# Patient Record
Sex: FEMALE | Race: WHITE | NOT HISPANIC OR LATINO | Employment: FULL TIME | URBAN - METROPOLITAN AREA
[De-identification: names, ages, dates, MRNs, and addresses within clinical notes are randomized per-mention and may not be internally consistent; named-entity substitution may affect disease eponyms.]

---

## 2020-04-28 ENCOUNTER — APPOINTMENT (OUTPATIENT)
Dept: WOMENS IMAGING | Facility: HOSPITAL | Age: 40
End: 2020-04-28

## 2020-04-28 PROCEDURE — 76641 ULTRASOUND BREAST COMPLETE: CPT | Performed by: RADIOLOGY

## 2020-04-28 PROCEDURE — G0279 TOMOSYNTHESIS, MAMMO: HCPCS | Performed by: RADIOLOGY

## 2020-04-28 PROCEDURE — 77066 DX MAMMO INCL CAD BI: CPT | Performed by: RADIOLOGY

## 2020-04-28 PROCEDURE — 77062 BREAST TOMOSYNTHESIS BI: CPT | Performed by: RADIOLOGY

## 2020-05-12 ENCOUNTER — APPOINTMENT (OUTPATIENT)
Dept: WOMENS IMAGING | Facility: HOSPITAL | Age: 40
End: 2020-05-12

## 2020-05-12 PROCEDURE — 76641 ULTRASOUND BREAST COMPLETE: CPT | Performed by: RADIOLOGY

## 2020-05-12 PROCEDURE — 19083 BX BREAST 1ST LESION US IMAG: CPT | Performed by: RADIOLOGY

## 2020-05-12 PROCEDURE — 19000 PUNCTURE ASPIR CYST BREAST: CPT | Performed by: RADIOLOGY

## 2020-05-12 PROCEDURE — 19001 PUNCTURE ASPIR CYST BRST EA: CPT | Performed by: RADIOLOGY

## 2021-05-05 ENCOUNTER — APPOINTMENT (OUTPATIENT)
Dept: WOMENS IMAGING | Facility: HOSPITAL | Age: 41
End: 2021-05-05

## 2021-05-05 PROCEDURE — 77067 SCR MAMMO BI INCL CAD: CPT | Performed by: RADIOLOGY

## 2021-05-05 PROCEDURE — 77063 BREAST TOMOSYNTHESIS BI: CPT | Performed by: RADIOLOGY

## 2022-05-06 ENCOUNTER — APPOINTMENT (OUTPATIENT)
Dept: WOMENS IMAGING | Facility: HOSPITAL | Age: 42
End: 2022-05-06

## 2022-05-06 PROCEDURE — 77063 BREAST TOMOSYNTHESIS BI: CPT | Performed by: RADIOLOGY

## 2022-05-06 PROCEDURE — 77067 SCR MAMMO BI INCL CAD: CPT | Performed by: RADIOLOGY

## 2023-03-31 ENCOUNTER — HOSPITAL ENCOUNTER (INPATIENT)
Facility: HOSPITAL | Age: 43
LOS: 6 days | Discharge: HOME | DRG: 338 | End: 2023-04-07
Attending: EMERGENCY MEDICINE | Admitting: SURGERY
Payer: COMMERCIAL

## 2023-03-31 ENCOUNTER — ANESTHESIA EVENT (OUTPATIENT)
Dept: PERIOP | Facility: HOSPITAL | Age: 43
DRG: 338 | End: 2023-03-31
Payer: COMMERCIAL

## 2023-03-31 ENCOUNTER — APPOINTMENT (OUTPATIENT)
Dept: CT IMAGING | Facility: HOSPITAL | Age: 43
DRG: 338 | End: 2023-03-31
Payer: COMMERCIAL

## 2023-03-31 ENCOUNTER — ANESTHESIA (OUTPATIENT)
Dept: PERIOP | Facility: HOSPITAL | Age: 43
DRG: 338 | End: 2023-03-31
Payer: COMMERCIAL

## 2023-03-31 DIAGNOSIS — T81.43XA POSTOPERATIVE INTRA-ABDOMINAL ABSCESS: ICD-10-CM

## 2023-03-31 DIAGNOSIS — K35.30 ACUTE APPENDICITIS WITH LOCALIZED PERITONITIS WITHOUT GANGRENE, UNSPECIFIED WHETHER ABSCESS PRESENT, UNSPECIFIED WHETHER PERFORATION PRESENT: Primary | ICD-10-CM

## 2023-03-31 DIAGNOSIS — K80.20 CALCULUS OF GALLBLADDER WITHOUT CHOLECYSTITIS WITHOUT OBSTRUCTION: ICD-10-CM

## 2023-03-31 PROBLEM — K35.32 ACUTE APPENDICITIS WITH PERFORATION, LOCALIZED PERITONITIS, AND GANGRENE, WITHOUT ABSCESS: Status: ACTIVE | Noted: 2023-03-31

## 2023-03-31 LAB
ALBUMIN SERPL-MCNC: 4.2 G/DL (ref 3.5–5.2)
ALBUMIN/GLOB SERPL: 1.4 G/DL
ALP SERPL-CCNC: 71 U/L (ref 39–117)
ALT SERPL W P-5'-P-CCNC: 18 U/L (ref 1–33)
ANION GAP SERPL CALCULATED.3IONS-SCNC: 10.7 MMOL/L (ref 5–15)
AST SERPL-CCNC: 13 U/L (ref 1–32)
B-HCG UR QL: NEGATIVE
BACTERIA UR QL AUTO: ABNORMAL /HPF
BASOPHILS # BLD AUTO: 0.06 10*3/MM3 (ref 0–0.2)
BASOPHILS NFR BLD AUTO: 0.3 % (ref 0–1.5)
BILIRUB SERPL-MCNC: 1.4 MG/DL (ref 0–1.2)
BILIRUB UR QL STRIP: NEGATIVE
BUN SERPL-MCNC: 9 MG/DL (ref 6–20)
BUN/CREAT SERPL: 9.4 (ref 7–25)
CALCIUM SPEC-SCNC: 9.2 MG/DL (ref 8.6–10.5)
CHLORIDE SERPL-SCNC: 101 MMOL/L (ref 98–107)
CLARITY UR: CLEAR
CO2 SERPL-SCNC: 25.3 MMOL/L (ref 22–29)
COLOR UR: ABNORMAL
CREAT SERPL-MCNC: 0.96 MG/DL (ref 0.57–1)
D-LACTATE SERPL-SCNC: 1.4 MMOL/L (ref 0.5–2)
DEPRECATED RDW RBC AUTO: 40.8 FL (ref 37–54)
EGFRCR SERPLBLD CKD-EPI 2021: 75.4 ML/MIN/1.73
EOSINOPHIL # BLD AUTO: 0 10*3/MM3 (ref 0–0.4)
EOSINOPHIL NFR BLD AUTO: 0 % (ref 0.3–6.2)
ERYTHROCYTE [DISTWIDTH] IN BLOOD BY AUTOMATED COUNT: 12.4 % (ref 12.3–15.4)
GLOBULIN UR ELPH-MCNC: 3 GM/DL
GLUCOSE SERPL-MCNC: 149 MG/DL (ref 65–99)
GLUCOSE UR STRIP-MCNC: NEGATIVE MG/DL
HCT VFR BLD AUTO: 42.2 % (ref 34–46.6)
HGB BLD-MCNC: 14.1 G/DL (ref 12–15.9)
HGB UR QL STRIP.AUTO: ABNORMAL
HOLD SPECIMEN: NORMAL
HYALINE CASTS UR QL AUTO: ABNORMAL /LPF
IMM GRANULOCYTES # BLD AUTO: 0.23 10*3/MM3 (ref 0–0.05)
IMM GRANULOCYTES NFR BLD AUTO: 1 % (ref 0–0.5)
KETONES UR QL STRIP: ABNORMAL
LEUKOCYTE ESTERASE UR QL STRIP.AUTO: ABNORMAL
LIPASE SERPL-CCNC: 15 U/L (ref 13–60)
LYMPHOCYTES # BLD AUTO: 0.87 10*3/MM3 (ref 0.7–3.1)
LYMPHOCYTES NFR BLD AUTO: 4 % (ref 19.6–45.3)
MCH RBC QN AUTO: 29.6 PG (ref 26.6–33)
MCHC RBC AUTO-ENTMCNC: 33.4 G/DL (ref 31.5–35.7)
MCV RBC AUTO: 88.7 FL (ref 79–97)
MONOCYTES # BLD AUTO: 1.26 10*3/MM3 (ref 0.1–0.9)
MONOCYTES NFR BLD AUTO: 5.7 % (ref 5–12)
NEUTROPHILS NFR BLD AUTO: 19.58 10*3/MM3 (ref 1.7–7)
NEUTROPHILS NFR BLD AUTO: 89 % (ref 42.7–76)
NITRITE UR QL STRIP: NEGATIVE
NRBC BLD AUTO-RTO: 0 /100 WBC (ref 0–0.2)
PH UR STRIP.AUTO: 5.5 [PH] (ref 5–8)
PLATELET # BLD AUTO: 337 10*3/MM3 (ref 140–450)
PMV BLD AUTO: 9.5 FL (ref 6–12)
POTASSIUM SERPL-SCNC: 3.5 MMOL/L (ref 3.5–5.2)
PROT SERPL-MCNC: 7.2 G/DL (ref 6–8.5)
PROT UR QL STRIP: ABNORMAL
RBC # BLD AUTO: 4.76 10*6/MM3 (ref 3.77–5.28)
RBC # UR STRIP: ABNORMAL /HPF
REF LAB TEST METHOD: ABNORMAL
SODIUM SERPL-SCNC: 137 MMOL/L (ref 136–145)
SP GR UR STRIP: 1.02 (ref 1–1.03)
SQUAMOUS #/AREA URNS HPF: ABNORMAL /HPF
UROBILINOGEN UR QL STRIP: ABNORMAL
WBC # UR STRIP: ABNORMAL /HPF
WBC NRBC COR # BLD: 22 10*3/MM3 (ref 3.4–10.8)
WHOLE BLOOD HOLD COAG: NORMAL

## 2023-03-31 PROCEDURE — G0378 HOSPITAL OBSERVATION PER HR: HCPCS

## 2023-03-31 PROCEDURE — 88304 TISSUE EXAM BY PATHOLOGIST: CPT | Performed by: SURGERY

## 2023-03-31 PROCEDURE — 25010000002 PIPERACILLIN SOD-TAZOBACTAM PER 1 G: Performed by: SURGERY

## 2023-03-31 PROCEDURE — 83605 ASSAY OF LACTIC ACID: CPT | Performed by: EMERGENCY MEDICINE

## 2023-03-31 PROCEDURE — 80053 COMPREHEN METABOLIC PANEL: CPT | Performed by: EMERGENCY MEDICINE

## 2023-03-31 PROCEDURE — 99222 1ST HOSP IP/OBS MODERATE 55: CPT | Performed by: SURGERY

## 2023-03-31 PROCEDURE — 83690 ASSAY OF LIPASE: CPT | Performed by: EMERGENCY MEDICINE

## 2023-03-31 PROCEDURE — 99285 EMERGENCY DEPT VISIT HI MDM: CPT

## 2023-03-31 PROCEDURE — 25510000001 IOPAMIDOL 61 % SOLUTION: Performed by: EMERGENCY MEDICINE

## 2023-03-31 PROCEDURE — 81025 URINE PREGNANCY TEST: CPT | Performed by: EMERGENCY MEDICINE

## 2023-03-31 PROCEDURE — 25010000002 ONDANSETRON PER 1 MG: Performed by: EMERGENCY MEDICINE

## 2023-03-31 PROCEDURE — 25010000002 DEXAMETHASONE SODIUM PHOSPHATE 20 MG/5ML SOLUTION

## 2023-03-31 PROCEDURE — 25010000002 PIPERACILLIN SOD-TAZOBACTAM PER 1 G: Performed by: EMERGENCY MEDICINE

## 2023-03-31 PROCEDURE — 25010000002 FENTANYL CITRATE (PF) 50 MCG/ML SOLUTION

## 2023-03-31 PROCEDURE — 25010000002 CEFAZOLIN IN DEXTROSE 2-4 GM/100ML-% SOLUTION: Performed by: SURGERY

## 2023-03-31 PROCEDURE — 25010000002 MIDAZOLAM PER 1 MG: Performed by: ANESTHESIOLOGY

## 2023-03-31 PROCEDURE — 25010000002 HYDROMORPHONE PER 4 MG

## 2023-03-31 PROCEDURE — 0DTJ4ZZ RESECTION OF APPENDIX, PERCUTANEOUS ENDOSCOPIC APPROACH: ICD-10-PCS | Performed by: SURGERY

## 2023-03-31 PROCEDURE — 74177 CT ABD & PELVIS W/CONTRAST: CPT

## 2023-03-31 PROCEDURE — 81001 URINALYSIS AUTO W/SCOPE: CPT | Performed by: EMERGENCY MEDICINE

## 2023-03-31 PROCEDURE — 87040 BLOOD CULTURE FOR BACTERIA: CPT | Performed by: EMERGENCY MEDICINE

## 2023-03-31 PROCEDURE — 25010000002 PROPOFOL 10 MG/ML EMULSION

## 2023-03-31 PROCEDURE — 44970 LAPAROSCOPY APPENDECTOMY: CPT | Performed by: SURGERY

## 2023-03-31 PROCEDURE — 25010000002 ONDANSETRON PER 1 MG

## 2023-03-31 PROCEDURE — 85025 COMPLETE CBC W/AUTO DIFF WBC: CPT | Performed by: EMERGENCY MEDICINE

## 2023-03-31 PROCEDURE — 25010000002 FENTANYL CITRATE (PF) 50 MCG/ML SOLUTION: Performed by: ANESTHESIOLOGY

## 2023-03-31 DEVICE — THE ECHELON, ECHELON ENDOPATH™ AND ECHELON FLEX™ FAMILIES OF ENDOSCOPIC LINEAR CUTTERS AND RELOADS ARE STERILE, SINGLE PATIENT USE INSTRUMENTS THAT SIMULTANEOUSLY CUT AND STAPLE TISSUE. THERE ARE SIX STAGGERED ROWS OF STAPLES, THREE ON EITHER SIDE OF THE CUT LINE. THE 45 MM INSTRUMENTS HAVE A STAPLE LINE THATIS APPROXIMATELY 45 MM LONG AND A CUT LINE THAT IS APPROXIMATELY 42 MM LONG. THE SHAFT CAN ROTATE FREELY IN BOTH DIRECTIONS AND AN ARTICULATION MECHANISM ON ARTICULATING INSTRUMENTS ENABLES BENDING THE DISTAL PORTIONOF THE SHAFT TO FACILITATE LATERAL ACCESS OF THE OPERATIVE SITE.THE INSTRUMENTS ARE SHIPPED WITHOUT A RELOAD AND MUST BE LOADED PRIOR TO USE. A STAPLE RETAINING CAP ON THE RELOAD PROTECTS THE STAPLE LEG POINTS DURING SHIPPING AND TRANSPORTATION. THE INSTRUMENTS’ LOCK-OUT FEATURE IS DESIGNED TO PREVENT A USED RELOAD FROM BEING REFIRED.
Type: IMPLANTABLE DEVICE | Site: ABDOMEN | Status: FUNCTIONAL
Brand: ECHELON ENDOPATH

## 2023-03-31 RX ORDER — FENTANYL CITRATE 50 UG/ML
50 INJECTION, SOLUTION INTRAMUSCULAR; INTRAVENOUS
Status: DISCONTINUED | OUTPATIENT
Start: 2023-03-31 | End: 2023-03-31 | Stop reason: HOSPADM

## 2023-03-31 RX ORDER — ACETAMINOPHEN 500 MG
1000 TABLET ORAL ONCE
Status: COMPLETED | OUTPATIENT
Start: 2023-03-31 | End: 2023-03-31

## 2023-03-31 RX ORDER — LABETALOL HYDROCHLORIDE 5 MG/ML
5 INJECTION, SOLUTION INTRAVENOUS
Status: DISCONTINUED | OUTPATIENT
Start: 2023-03-31 | End: 2023-03-31 | Stop reason: HOSPADM

## 2023-03-31 RX ORDER — HYDROCODONE BITARTRATE AND ACETAMINOPHEN 7.5; 325 MG/1; MG/1
1 TABLET ORAL ONCE AS NEEDED
Status: DISCONTINUED | OUTPATIENT
Start: 2023-03-31 | End: 2023-03-31 | Stop reason: HOSPADM

## 2023-03-31 RX ORDER — HYDROMORPHONE HYDROCHLORIDE 1 MG/ML
0.5 INJECTION, SOLUTION INTRAMUSCULAR; INTRAVENOUS; SUBCUTANEOUS
Status: DISCONTINUED | OUTPATIENT
Start: 2023-03-31 | End: 2023-03-31 | Stop reason: HOSPADM

## 2023-03-31 RX ORDER — MAGNESIUM HYDROXIDE 1200 MG/15ML
LIQUID ORAL AS NEEDED
Status: DISCONTINUED | OUTPATIENT
Start: 2023-03-31 | End: 2023-03-31 | Stop reason: HOSPADM

## 2023-03-31 RX ORDER — OXYCODONE AND ACETAMINOPHEN 7.5; 325 MG/1; MG/1
1 TABLET ORAL EVERY 4 HOURS PRN
Status: DISCONTINUED | OUTPATIENT
Start: 2023-03-31 | End: 2023-03-31 | Stop reason: HOSPADM

## 2023-03-31 RX ORDER — DEXTROSE, SODIUM CHLORIDE, AND POTASSIUM CHLORIDE 5; .45; .15 G/100ML; G/100ML; G/100ML
100 INJECTION INTRAVENOUS CONTINUOUS
Status: DISCONTINUED | OUTPATIENT
Start: 2023-03-31 | End: 2023-04-02

## 2023-03-31 RX ORDER — DEXAMETHASONE SODIUM PHOSPHATE 4 MG/ML
INJECTION, SOLUTION INTRA-ARTICULAR; INTRALESIONAL; INTRAMUSCULAR; INTRAVENOUS; SOFT TISSUE AS NEEDED
Status: DISCONTINUED | OUTPATIENT
Start: 2023-03-31 | End: 2023-03-31 | Stop reason: SURG

## 2023-03-31 RX ORDER — IPRATROPIUM BROMIDE AND ALBUTEROL SULFATE 2.5; .5 MG/3ML; MG/3ML
3 SOLUTION RESPIRATORY (INHALATION) ONCE AS NEEDED
Status: DISCONTINUED | OUTPATIENT
Start: 2023-03-31 | End: 2023-03-31 | Stop reason: HOSPADM

## 2023-03-31 RX ORDER — ONDANSETRON 2 MG/ML
INJECTION INTRAMUSCULAR; INTRAVENOUS AS NEEDED
Status: DISCONTINUED | OUTPATIENT
Start: 2023-03-31 | End: 2023-03-31 | Stop reason: SURG

## 2023-03-31 RX ORDER — DROPERIDOL 2.5 MG/ML
0.62 INJECTION, SOLUTION INTRAMUSCULAR; INTRAVENOUS
Status: DISCONTINUED | OUTPATIENT
Start: 2023-03-31 | End: 2023-03-31 | Stop reason: HOSPADM

## 2023-03-31 RX ORDER — GABAPENTIN 300 MG/1
600 CAPSULE ORAL ONCE
Status: COMPLETED | OUTPATIENT
Start: 2023-03-31 | End: 2023-03-31

## 2023-03-31 RX ORDER — SODIUM CHLORIDE 0.9 % (FLUSH) 0.9 %
10 SYRINGE (ML) INJECTION EVERY 12 HOURS SCHEDULED
Status: DISCONTINUED | OUTPATIENT
Start: 2023-03-31 | End: 2023-04-07

## 2023-03-31 RX ORDER — NALOXONE HCL 0.4 MG/ML
0.2 VIAL (ML) INJECTION AS NEEDED
Status: DISCONTINUED | OUTPATIENT
Start: 2023-03-31 | End: 2023-03-31 | Stop reason: HOSPADM

## 2023-03-31 RX ORDER — HYDROMORPHONE HYDROCHLORIDE 1 MG/ML
0.5 INJECTION, SOLUTION INTRAMUSCULAR; INTRAVENOUS; SUBCUTANEOUS
Status: DISCONTINUED | OUTPATIENT
Start: 2023-03-31 | End: 2023-04-06

## 2023-03-31 RX ORDER — SODIUM CHLORIDE 0.9 % (FLUSH) 0.9 %
10 SYRINGE (ML) INJECTION AS NEEDED
Status: DISCONTINUED | OUTPATIENT
Start: 2023-03-31 | End: 2023-03-31

## 2023-03-31 RX ORDER — DIPHENHYDRAMINE HYDROCHLORIDE 50 MG/ML
12.5 INJECTION INTRAMUSCULAR; INTRAVENOUS
Status: DISCONTINUED | OUTPATIENT
Start: 2023-03-31 | End: 2023-03-31 | Stop reason: HOSPADM

## 2023-03-31 RX ORDER — SODIUM CHLORIDE 0.9 % (FLUSH) 0.9 %
10 SYRINGE (ML) INJECTION AS NEEDED
Status: DISCONTINUED | OUTPATIENT
Start: 2023-03-31 | End: 2023-04-07 | Stop reason: HOSPADM

## 2023-03-31 RX ORDER — FAMOTIDINE 10 MG/ML
20 INJECTION, SOLUTION INTRAVENOUS ONCE
Status: COMPLETED | OUTPATIENT
Start: 2023-03-31 | End: 2023-03-31

## 2023-03-31 RX ORDER — PROPOFOL 10 MG/ML
VIAL (ML) INTRAVENOUS AS NEEDED
Status: DISCONTINUED | OUTPATIENT
Start: 2023-03-31 | End: 2023-03-31 | Stop reason: SURG

## 2023-03-31 RX ORDER — LIDOCAINE HYDROCHLORIDE 20 MG/ML
INJECTION, SOLUTION EPIDURAL; INFILTRATION; INTRACAUDAL; PERINEURAL AS NEEDED
Status: DISCONTINUED | OUTPATIENT
Start: 2023-03-31 | End: 2023-03-31 | Stop reason: SURG

## 2023-03-31 RX ORDER — BUPIVACAINE HYDROCHLORIDE AND EPINEPHRINE 5; 5 MG/ML; UG/ML
INJECTION, SOLUTION EPIDURAL; INTRACAUDAL; PERINEURAL AS NEEDED
Status: DISCONTINUED | OUTPATIENT
Start: 2023-03-31 | End: 2023-03-31 | Stop reason: HOSPADM

## 2023-03-31 RX ORDER — ONDANSETRON 2 MG/ML
4 INJECTION INTRAMUSCULAR; INTRAVENOUS ONCE
Status: COMPLETED | OUTPATIENT
Start: 2023-03-31 | End: 2023-03-31

## 2023-03-31 RX ORDER — OXYCODONE HYDROCHLORIDE AND ACETAMINOPHEN 5; 325 MG/1; MG/1
1 TABLET ORAL EVERY 4 HOURS PRN
Status: DISCONTINUED | OUTPATIENT
Start: 2023-03-31 | End: 2023-04-07 | Stop reason: HOSPADM

## 2023-03-31 RX ORDER — SODIUM CHLORIDE, SODIUM LACTATE, POTASSIUM CHLORIDE, CALCIUM CHLORIDE 600; 310; 30; 20 MG/100ML; MG/100ML; MG/100ML; MG/100ML
9 INJECTION, SOLUTION INTRAVENOUS CONTINUOUS
Status: DISCONTINUED | OUTPATIENT
Start: 2023-03-31 | End: 2023-03-31

## 2023-03-31 RX ORDER — MIDAZOLAM HYDROCHLORIDE 1 MG/ML
1 INJECTION INTRAMUSCULAR; INTRAVENOUS
Status: DISCONTINUED | OUTPATIENT
Start: 2023-03-31 | End: 2023-03-31 | Stop reason: HOSPADM

## 2023-03-31 RX ORDER — PROMETHAZINE HYDROCHLORIDE 25 MG/1
25 TABLET ORAL ONCE AS NEEDED
Status: DISCONTINUED | OUTPATIENT
Start: 2023-03-31 | End: 2023-03-31 | Stop reason: HOSPADM

## 2023-03-31 RX ORDER — NALOXONE HCL 0.4 MG/ML
0.4 VIAL (ML) INJECTION
Status: DISCONTINUED | OUTPATIENT
Start: 2023-03-31 | End: 2023-04-06

## 2023-03-31 RX ORDER — LIDOCAINE HYDROCHLORIDE 10 MG/ML
0.5 INJECTION, SOLUTION EPIDURAL; INFILTRATION; INTRACAUDAL; PERINEURAL ONCE AS NEEDED
Status: DISCONTINUED | OUTPATIENT
Start: 2023-03-31 | End: 2023-03-31 | Stop reason: HOSPADM

## 2023-03-31 RX ORDER — SODIUM CHLORIDE 0.9 % (FLUSH) 0.9 %
3-10 SYRINGE (ML) INJECTION AS NEEDED
Status: DISCONTINUED | OUTPATIENT
Start: 2023-03-31 | End: 2023-03-31 | Stop reason: HOSPADM

## 2023-03-31 RX ORDER — ROCURONIUM BROMIDE 10 MG/ML
INJECTION, SOLUTION INTRAVENOUS AS NEEDED
Status: DISCONTINUED | OUTPATIENT
Start: 2023-03-31 | End: 2023-03-31 | Stop reason: SURG

## 2023-03-31 RX ORDER — SODIUM CHLORIDE 0.9 % (FLUSH) 0.9 %
3 SYRINGE (ML) INJECTION EVERY 12 HOURS SCHEDULED
Status: DISCONTINUED | OUTPATIENT
Start: 2023-03-31 | End: 2023-03-31 | Stop reason: HOSPADM

## 2023-03-31 RX ORDER — ONDANSETRON 2 MG/ML
4 INJECTION INTRAMUSCULAR; INTRAVENOUS EVERY 6 HOURS PRN
Status: DISCONTINUED | OUTPATIENT
Start: 2023-03-31 | End: 2023-04-07

## 2023-03-31 RX ORDER — ONDANSETRON 2 MG/ML
4 INJECTION INTRAMUSCULAR; INTRAVENOUS ONCE AS NEEDED
Status: DISCONTINUED | OUTPATIENT
Start: 2023-03-31 | End: 2023-03-31 | Stop reason: HOSPADM

## 2023-03-31 RX ORDER — CEFAZOLIN SODIUM 2 G/100ML
2 INJECTION, SOLUTION INTRAVENOUS ONCE
Status: COMPLETED | OUTPATIENT
Start: 2023-03-31 | End: 2023-03-31

## 2023-03-31 RX ORDER — FLUMAZENIL 0.1 MG/ML
0.2 INJECTION INTRAVENOUS AS NEEDED
Status: DISCONTINUED | OUTPATIENT
Start: 2023-03-31 | End: 2023-03-31 | Stop reason: HOSPADM

## 2023-03-31 RX ORDER — HYDRALAZINE HYDROCHLORIDE 20 MG/ML
5 INJECTION INTRAMUSCULAR; INTRAVENOUS
Status: DISCONTINUED | OUTPATIENT
Start: 2023-03-31 | End: 2023-03-31 | Stop reason: HOSPADM

## 2023-03-31 RX ORDER — SODIUM CHLORIDE 9 MG/ML
40 INJECTION, SOLUTION INTRAVENOUS AS NEEDED
Status: DISCONTINUED | OUTPATIENT
Start: 2023-03-31 | End: 2023-04-07 | Stop reason: HOSPADM

## 2023-03-31 RX ORDER — EPHEDRINE SULFATE 50 MG/ML
5 INJECTION, SOLUTION INTRAVENOUS ONCE AS NEEDED
Status: DISCONTINUED | OUTPATIENT
Start: 2023-03-31 | End: 2023-03-31 | Stop reason: HOSPADM

## 2023-03-31 RX ORDER — SODIUM CHLORIDE 9 MG/ML
200 INJECTION, SOLUTION INTRAVENOUS CONTINUOUS
Status: DISCONTINUED | OUTPATIENT
Start: 2023-03-31 | End: 2023-03-31

## 2023-03-31 RX ORDER — PROMETHAZINE HYDROCHLORIDE 25 MG/1
25 SUPPOSITORY RECTAL ONCE AS NEEDED
Status: DISCONTINUED | OUTPATIENT
Start: 2023-03-31 | End: 2023-03-31 | Stop reason: HOSPADM

## 2023-03-31 RX ORDER — FENTANYL CITRATE 50 UG/ML
INJECTION, SOLUTION INTRAMUSCULAR; INTRAVENOUS AS NEEDED
Status: DISCONTINUED | OUTPATIENT
Start: 2023-03-31 | End: 2023-03-31 | Stop reason: SURG

## 2023-03-31 RX ADMIN — GABAPENTIN 600 MG: 300 CAPSULE ORAL at 16:04

## 2023-03-31 RX ADMIN — FAMOTIDINE 20 MG: 10 INJECTION INTRAVENOUS at 16:04

## 2023-03-31 RX ADMIN — ONDANSETRON 4 MG: 2 INJECTION INTRAMUSCULAR; INTRAVENOUS at 16:31

## 2023-03-31 RX ADMIN — IOPAMIDOL 85 ML: 612 INJECTION, SOLUTION INTRAVENOUS at 13:45

## 2023-03-31 RX ADMIN — TAZOBACTAM SODIUM AND PIPERACILLIN SODIUM 3.38 G: 375; 3 INJECTION, SOLUTION INTRAVENOUS at 23:51

## 2023-03-31 RX ADMIN — TAZOBACTAM SODIUM AND PIPERACILLIN SODIUM 4.5 G: 500; 4 INJECTION, SOLUTION INTRAVENOUS at 14:45

## 2023-03-31 RX ADMIN — CEFAZOLIN SODIUM 2 G: 2 INJECTION, SOLUTION INTRAVENOUS at 16:12

## 2023-03-31 RX ADMIN — SODIUM CHLORIDE, POTASSIUM CHLORIDE, SODIUM LACTATE AND CALCIUM CHLORIDE 9 ML/HR: 600; 310; 30; 20 INJECTION, SOLUTION INTRAVENOUS at 16:13

## 2023-03-31 RX ADMIN — PROPOFOL 180 MG: 10 INJECTION, EMULSION INTRAVENOUS at 16:25

## 2023-03-31 RX ADMIN — ACETAMINOPHEN 1000 MG: 500 TABLET ORAL at 12:28

## 2023-03-31 RX ADMIN — DEXAMETHASONE SODIUM PHOSPHATE 8 MG: 4 INJECTION, SOLUTION INTRAMUSCULAR; INTRAVENOUS at 16:31

## 2023-03-31 RX ADMIN — FENTANYL CITRATE 50 MCG: 50 INJECTION, SOLUTION INTRAMUSCULAR; INTRAVENOUS at 16:31

## 2023-03-31 RX ADMIN — ONDANSETRON 4 MG: 2 INJECTION INTRAMUSCULAR; INTRAVENOUS at 12:28

## 2023-03-31 RX ADMIN — SUGAMMADEX 200 MG: 100 INJECTION, SOLUTION INTRAVENOUS at 17:09

## 2023-03-31 RX ADMIN — FENTANYL CITRATE 50 MCG: 50 INJECTION, SOLUTION INTRAMUSCULAR; INTRAVENOUS at 16:24

## 2023-03-31 RX ADMIN — SODIUM CHLORIDE 200 ML/HR: 9 INJECTION, SOLUTION INTRAVENOUS at 14:45

## 2023-03-31 RX ADMIN — OXYCODONE AND ACETAMINOPHEN 1 TABLET: 5; 325 TABLET ORAL at 18:50

## 2023-03-31 RX ADMIN — POTASSIUM CHLORIDE, DEXTROSE MONOHYDRATE AND SODIUM CHLORIDE 100 ML/HR: 150; 5; 450 INJECTION, SOLUTION INTRAVENOUS at 18:50

## 2023-03-31 RX ADMIN — MIDAZOLAM 1 MG: 1 INJECTION INTRAMUSCULAR; INTRAVENOUS at 16:05

## 2023-03-31 RX ADMIN — HYDROMORPHONE HYDROCHLORIDE 0.5 MG: 1 INJECTION, SOLUTION INTRAMUSCULAR; INTRAVENOUS; SUBCUTANEOUS at 17:43

## 2023-03-31 RX ADMIN — LIDOCAINE HYDROCHLORIDE 100 MG: 20 INJECTION, SOLUTION EPIDURAL; INFILTRATION; INTRACAUDAL; PERINEURAL at 16:25

## 2023-03-31 RX ADMIN — ROCURONIUM BROMIDE 50 MG: 10 INJECTION, SOLUTION INTRAVENOUS at 16:25

## 2023-03-31 RX ADMIN — FENTANYL CITRATE 50 MCG: 50 INJECTION, SOLUTION INTRAMUSCULAR; INTRAVENOUS at 16:05

## 2023-03-31 RX ADMIN — SODIUM CHLORIDE 1000 ML: 9 INJECTION, SOLUTION INTRAVENOUS at 12:28

## 2023-03-31 NOTE — ANESTHESIA POSTPROCEDURE EVALUATION
Patient: Patria Norris    Procedure Summary     Date: 03/31/23 Room / Location: [facility] OR 02 / [facility] MAIN OR    Anesthesia Start: 1618 Anesthesia Stop: 1720    Procedure: Laparoscopic appendectomy (Abdomen) Diagnosis:       Acute appendicitis with localized peritonitis without gangrene, unspecified whether abscess present, unspecified whether perforation present      (Acute appendicitis with localized peritonitis without gangrene, unspecified whether abscess present, unspecified whether perforation present [K35.30])    Surgeons: Amarilis Franklin MD Provider: Chava Bhagat MD    Anesthesia Type: general ASA Status: 3 - Emergent          Anesthesia Type: general    Vitals  Vitals Value Taken Time   /68 03/31/23 1801   Temp 37.4 °C (99.4 °F) 03/31/23 1723   Pulse 78 03/31/23 1807   Resp 16 03/31/23 1723   SpO2 100 % 03/31/23 1807   Vitals shown include unvalidated device data.        Post Anesthesia Care and Evaluation    Patient location during evaluation: PACU  Patient participation: complete - patient participated  Level of consciousness: awake  Pain score: 2  Pain management: adequate    Airway patency: patent  Anesthetic complications: No anesthetic complications  PONV Status: controlled  Cardiovascular status: acceptable  Respiratory status: acceptable  Hydration status: acceptable

## 2023-03-31 NOTE — ANESTHESIA PREPROCEDURE EVALUATION
Anesthesia Evaluation     no history of anesthetic complications:  NPO Solid Status: > 8 hours  NPO Liquid Status: > 2 hours           Airway   Mallampati: II  Neck ROM: full  no difficulty expected  Dental - normal exam     Pulmonary - normal exam   (+) a smoker Former,   (-) COPD, asthma, sleep apnea    PE comment: nonlabored  Cardiovascular - negative cardio ROS and normal exam  Exercise tolerance: good (4-7 METS)    Rhythm: regular  Rate: normal    (-) hypertension, valvular problems/murmurs, past MI, CAD, dysrhythmias, angina      Neuro/Psych- negative ROS  (-) seizures, TIA, CVA  GI/Hepatic/Renal/Endo    (+) morbid obesity,    (-) GERD, liver disease, no renal disease, diabetes, no thyroid disorder    ROS Comment: Acute appendicitis    Musculoskeletal (-) negative ROS    Abdominal    Substance History      OB/GYN          Other                        Anesthesia Plan    ASA 3 - emergent     general     intravenous induction     Anesthetic plan, risks, benefits, and alternatives have been provided, discussed and informed consent has been obtained with: patient.        CODE STATUS:

## 2023-03-31 NOTE — H&P
General Surgery H&P    CC: Abdominal pain    HPI:   The patient is a very pleasant 42 y.o. female that presented to the hospital with acute onset right lower quadrant abdominal pain without radiation that started 36 hours ago with associated nausea and vomiting.  She has had subjective fever and chills.  The pain has been constant and rapidly worsening in severity.  She came to the emergency room where a CT abdomen/pelvis demonstrated extensive acute appendicitis.    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    Family History: Noncontributory    Review of Systems:  A comprehensive review of systems was negative except for the following positives: Abdominal pain, nausea, vomiting, fever, and chills    Physical Exam:   Vitals:    03/31/23 1401   BP: 112/74   Pulse: 85   Resp:    Temp:    SpO2: 97%     Height: 162 cm  Weight: 115 kg  BMI: 43.6  GENERAL: no acute distress, awake and alert  HEENT: normocephalic, atraumatic, no scleral icterus, moist mucous membranes  NECK: Supple, there is no thyromegaly or lymphadenopathy  RESPIRATORY: clear to auscultation bilaterally, no wheezes  CARDIOVASCULAR: regular rate and rhythm   GASTROINTESTINAL: Soft, nondistended, focal right lower quadrant and to a lesser extent right upper quadrant tenderness to palpation without rebound or guarding  MUSCULOSKELETAL: no cyanosis, clubbing, or edema  NEUROLOGIC: alert and oriented, normal speech, cranial nerves 2-12 grossly intact, no focal deficits  SKIN: Moist, warm, no rashes, no jaundice    Diagnostic workup:     Pertinent labs:   Results from last 7 days   Lab Units 03/31/23  1143   WBC 10*3/mm3 22.00*   HEMOGLOBIN g/dL 14.1   HEMATOCRIT % 42.2   PLATELETS 10*3/mm3 337     Results from last 7 days   Lab Units 03/31/23  1143   SODIUM mmol/L 137   POTASSIUM mmol/L  3.5   CHLORIDE mmol/L 101   CO2 mmol/L 25.3   BUN mg/dL 9   CREATININE mg/dL 0.96   CALCIUM mg/dL 9.2   BILIRUBIN mg/dL 1.4*   ALK PHOS U/L 71   ALT (SGPT) U/L 18   AST (SGOT) U/L 13   GLUCOSE mg/dL 149*       IMAGING:  CT ABD/PELVIS:  IMPRESSION:  1. Extensive acute appendicitis. There is no evidence for rupture or abscess.  2. There is cholelithiasis without cholecystitis and there is no biliary dilatation.    Assessment and plan:     The patient is a 42 y.o. female with acute appendicitis    I reviewed her CT scan and blood work and I recommended we admit her to the hospital for observation, begin her on empiric antibiotics, and go to the operating room tonight for laparoscopic appendectomy.  She understands if the appendix is found to be ruptured with purulent peritonitis a drain will be left in place and she will be kept for a few days of postoperative antibiotics.  If all goes well and the appendix has not ruptured, she will likely be stable for discharge home tomorrow.  We discussed other risks of the surgery which include bleeding, wound infection, intra-abdominal abscess development, and damage to surrounding structures such as the right ureter, small bowel, or cecum.  Despite these risks, she has consented to proceed.    Amarilis Franklin MD  General, Robotic, and Endoscopic Surgery  [facility]    [address], [address]  [place] [address]  P: [phone]  F: [phone]

## 2023-03-31 NOTE — ED PROVIDER NOTES
EMERGENCY DEPARTMENT ENCOUNTER    Room Number:  33/33  Date seen:  3/31/2023  PCP: Provider, No Known  Historian: Patient      HPI:  Chief Complaint: Abdominal pain  A complete HPI/ROS/PMH/PSH/SH/FH are unobtainable due to: Nothing  Context: Patria Norris is a 42 y.o. female who presents to the ED c/o abdominal pain.  Patient reports that she began having nausea and vomiting and dry heaving on Tuesday.  She had 1 episode of diarrhea today.  She is currently on her menstrual cycle.  No prior abdominal surgeries.  She denies black or bloody stool.  She had subjective fever and chills last night but did not take her temperature.            PAST MEDICAL HISTORY  Active Ambulatory Problems     Diagnosis Date Noted   • No Active Ambulatory Problems     Resolved Ambulatory Problems     Diagnosis Date Noted   • No Resolved Ambulatory Problems     No Additional Past Medical History         PAST SURGICAL HISTORY  History reviewed. No pertinent surgical history.      FAMILY HISTORY  History reviewed. No pertinent family history.      SOCIAL HISTORY  Social History     Socioeconomic History   • Marital status: Single   Tobacco Use   • Smoking status: Former     Types: Cigarettes   • Smokeless tobacco: Never   Vaping Use   • Vaping Use: Never used   Substance and Sexual Activity   • Alcohol use: Not Currently   • Drug use: Never   • Sexual activity: Defer         ALLERGIES  Patient has no known allergies.        REVIEW OF SYSTEMS  Review of Systems   Review of all 14 systems is negative other than stated in the HPI above.      PHYSICAL EXAM  ED Triage Vitals   Temp Heart Rate Resp BP SpO2   03/31/23 1118 03/31/23 1118 03/31/23 1118 03/31/23 1122 03/31/23 1118   99 °F (37.2 °C) (!) 129 17 147/100 96 %      Temp src Heart Rate Source Patient Position BP Location FiO2 (%)   03/31/23 1118 -- 03/31/23 1122 -- --   Tympanic  Lying         Physical Exam      GENERAL: Awake and alert, no acute distress  HENT: nares patent  EYES: no  scleral icterus  CV: regular rhythm, normal rate  RESPIRATORY: normal effort  ABDOMEN: soft, moderate diffuse tenderness across the lower abdomen, particular tender in the left lower quadrant, no rebound tenderness in McBurney's point.  There is mild tenderness across the upper abdomen.  MUSCULOSKELETAL: no deformity  NEURO: alert, moves all extremities, follows commands  PSYCH:  calm, cooperative  SKIN: warm, dry    Vital signs and nursing notes reviewed.          LAB RESULTS  Recent Results (from the past 24 hour(s))   Comprehensive Metabolic Panel    Collection Time: 03/31/23 11:43 AM    Specimen: Blood   Result Value Ref Range    Glucose 149 (H) 65 - 99 mg/dL    BUN 9 6 - 20 mg/dL    Creatinine 0.96 0.57 - 1.00 mg/dL    Sodium 137 136 - 145 mmol/L    Potassium 3.5 3.5 - 5.2 mmol/L    Chloride 101 98 - 107 mmol/L    CO2 25.3 22.0 - 29.0 mmol/L    Calcium 9.2 8.6 - 10.5 mg/dL    Total Protein 7.2 6.0 - 8.5 g/dL    Albumin 4.2 3.5 - 5.2 g/dL    ALT (SGPT) 18 1 - 33 U/L    AST (SGOT) 13 1 - 32 U/L    Alkaline Phosphatase 71 39 - 117 U/L    Total Bilirubin 1.4 (H) 0.0 - 1.2 mg/dL    Globulin 3.0 gm/dL    A/G Ratio 1.4 g/dL    BUN/Creatinine Ratio 9.4 7.0 - 25.0    Anion Gap 10.7 5.0 - 15.0 mmol/L    eGFR 75.4 >60.0 mL/min/1.73   Lipase    Collection Time: 03/31/23 11:43 AM    Specimen: Blood   Result Value Ref Range    Lipase 15 13 - 60 U/L   CBC Auto Differential    Collection Time: 03/31/23 11:43 AM    Specimen: Blood   Result Value Ref Range    WBC 22.00 (H) 3.40 - 10.80 10*3/mm3    RBC 4.76 3.77 - 5.28 10*6/mm3    Hemoglobin 14.1 12.0 - 15.9 g/dL    Hematocrit 42.2 34.0 - 46.6 %    MCV 88.7 79.0 - 97.0 fL    MCH 29.6 26.6 - 33.0 pg    MCHC 33.4 31.5 - 35.7 g/dL    RDW 12.4 12.3 - 15.4 %    RDW-SD 40.8 37.0 - 54.0 fl    MPV 9.5 6.0 - 12.0 fL    Platelets 337 140 - 450 10*3/mm3    Neutrophil % 89.0 (H) 42.7 - 76.0 %    Lymphocyte % 4.0 (L) 19.6 - 45.3 %    Monocyte % 5.7 5.0 - 12.0 %    Eosinophil % 0.0 (L) 0.3 -  6.2 %    Basophil % 0.3 0.0 - 1.5 %    Immature Grans % 1.0 (H) 0.0 - 0.5 %    Neutrophils, Absolute 19.58 (H) 1.70 - 7.00 10*3/mm3    Lymphocytes, Absolute 0.87 0.70 - 3.10 10*3/mm3    Monocytes, Absolute 1.26 (H) 0.10 - 0.90 10*3/mm3    Eosinophils, Absolute 0.00 0.00 - 0.40 10*3/mm3    Basophils, Absolute 0.06 0.00 - 0.20 10*3/mm3    Immature Grans, Absolute 0.23 (H) 0.00 - 0.05 10*3/mm3    nRBC 0.0 0.0 - 0.2 /100 WBC   Gold Top - SST    Collection Time: 03/31/23 11:43 AM   Result Value Ref Range    Extra Tube Hold for add-ons.    Light Blue Top    Collection Time: 03/31/23 11:43 AM   Result Value Ref Range    Extra Tube Hold for add-ons.    Urinalysis With Microscopic If Indicated (No Culture) - Urine, Clean Catch    Collection Time: 03/31/23 12:03 PM    Specimen: Urine, Clean Catch   Result Value Ref Range    Color, UA Dark Yellow (A) Yellow, Straw    Appearance, UA Clear Clear    pH, UA 5.5 5.0 - 8.0    Specific Gravity, UA 1.020 1.005 - 1.030    Glucose, UA Negative Negative    Ketones, UA Trace (A) Negative    Bilirubin, UA Negative Negative    Blood, UA Large (3+) (A) Negative    Protein, UA 30 mg/dL (1+) (A) Negative    Leuk Esterase, UA Small (1+) (A) Negative    Nitrite, UA Negative Negative    Urobilinogen, UA 1.0 E.U./dL 0.2 - 1.0 E.U./dL   Pregnancy, Urine - Urine, Clean Catch    Collection Time: 03/31/23 12:03 PM    Specimen: Urine, Clean Catch   Result Value Ref Range    HCG, Urine QL Negative Negative   Urinalysis, Microscopic Only - Urine, Clean Catch    Collection Time: 03/31/23 12:03 PM    Specimen: Urine, Clean Catch   Result Value Ref Range    RBC, UA Too Numerous to Count (A) None Seen, 0-2 /HPF    WBC, UA 6-12 (A) None Seen, 0-2 /HPF    Bacteria, UA None Seen None Seen /HPF    Squamous Epithelial Cells, UA 0-2 None Seen, 0-2 /HPF    Hyaline Casts, UA None Seen None Seen /LPF    Methodology Automated Microscopy        Ordered the above labs and reviewed the results.        RADIOLOGY  CT  Abdomen Pelvis With Contrast    Result Date: 3/31/2023  CT ABDOMEN AND PELVIS WITH IV CONTRAST  HISTORY: 42-year-old female with lower abdominal pain.  TECHNIQUE: Radiation dose reduction techniques were utilized, including automated exposure control and exposure modulation based on body size. 3 mm images were obtained through the abdomen and pelvis after the administration of IV contrast. There is no previous CT for comparison.  FINDINGS: The appendix is dilated and contains appendicoliths measuring 1.9 cm in diameter. There is surrounding inflammatory change and a small amount of free fluid, but there is no fluid collection. The cecum is subhepatic and not fixated in the right lower quadrant. There is extensive cholelithiasis without evidence for cholecystitis. The liver, spleen, pancreas, adrenals, and kidneys appear unremarkable. Uterus and adnexa appear unremarkable as well. There are no significant abdominal aortic atherosclerotic changes.      1. Extensive acute appendicitis. There is no evidence for rupture or abscess. 2. There is cholelithiasis without cholecystitis and there is no biliary dilatation.  Discussed with Dr. Kaur.        Ordered the above noted radiological studies. Reviewed by me in PACS.            PROCEDURES  Procedures              MEDICATIONS GIVEN IN ER  Medications   sodium chloride 0.9 % flush 10 mL (has no administration in time range)   piperacillin-tazobactam (ZOSYN) 4.5 g in iso-osmotic dextrose 100 mL IVPB (premix) (has no administration in time range)   sodium chloride 0.9 % infusion (has no administration in time range)   acetaminophen (TYLENOL) tablet 1,000 mg (1,000 mg Oral Given 3/31/23 1228)   sodium chloride 0.9 % bolus 1,000 mL (0 mL Intravenous Stopped 3/31/23 1428)   ondansetron (ZOFRAN) injection 4 mg (4 mg Intravenous Given 3/31/23 1228)   iopamidol (ISOVUE-300) 61 % injection 100 mL (85 mL Intravenous Given by Other 3/31/23 1345)                   MEDICAL DECISION  MAKING, PROGRESS, and CONSULTS    All labs have been independently reviewed by me.  All radiology studies have been reviewed by me and I have also reviewed the radiology report.   EKG's independently viewed and interpreted by me.  Discussion below represents my analysis of pertinent findings related to patient's condition, differential diagnosis, treatment plan and final disposition.      Additional sources:  - Discussed/ obtained information from independent historians: N/A        - Shared decision making: Patient informed of findings of acute appendicitis and need for appendectomy today.      Orders placed during this visit:  Orders Placed This Encounter   Procedures   • Blood Culture - Blood,   • Blood Culture - Blood,   • CT Abdomen Pelvis With Contrast   • Comprehensive Metabolic Panel   • Lipase   • Urinalysis With Microscopic If Indicated (No Culture) - Urine, Clean Catch   • Pregnancy, Urine - Urine, Clean Catch   • CBC Auto Differential   • [place] Draw   • Urinalysis, Microscopic Only - Urine, Clean Catch   • Lactic Acid, Plasma   • NPO Diet NPO Type: Strict NPO   • Surgery (on-call MD unless specified)   • Insert Peripheral IV   • Initiate Observation Status   • CBC & Differential   • Gold Top - SST   • Light Blue Top             Differential diagnosis includes but is not limited to:    Appendicitis  Cholecystitis  Gastroenteritis  Diverticulitis        Independent interpretation of labs, radiology studies, and discussions with consultants:  ED Course as of 03/31/23 1432   Fri Mar 31, 2023   1220 WBC(!): 22.00 [JR]   1255 WBC, UA(!): 6-12 [JR]   1255 RBC, UA(!): Too Numerous to Count  Patient is currently on her menstrual cycle. [JR]   1357 CT abdomen pelvis independently interpreted in PACS.  There is a large appendicolith at the base of the appendix and the appendix that is inflamed with periappendiceal fat stranding.  Findings consistent with acute appendicitis.  She also has a few large gallstones  within the gallbladder without evidence of acute cholecystitis. [JR]   1358 I have ordered empiric Zosyn, placed general Surgery consult. [JR]   1358 Discussed with Dr. Franklin, general surgery, who agrees to admit for appendectomy. [JR]      ED Course User Index  [JR] Enzo Kaur MD             I wore an N95 mask, face shield, and gloves during this patient encounter.  Patient also wearing a surgical mask.  Hand hygeine performed before and after seeing the patient.    DIAGNOSIS  Final diagnoses:   Acute appendicitis with localized peritonitis without gangrene, unspecified whether abscess present, unspecified whether perforation present   Calculus of gallbladder without cholecystitis without obstruction         DISPOSITION  Admit            Latest Documented Vital Signs:  As of 14:32 EDT  BP- 112/74 HR- 85 Temp- 99 °F (37.2 °C) (Tympanic) O2 sat- 97%              --    Please note that portions of this were completed with a voice recognition program.       Note Disclaimer: At [facility], we believe that sharing information builds trust and better relationships. You are receiving this note because you are receiving care at [facility] or recently visited. It is possible you will see health information before a provider has talked with you about it. This kind of information can be easy to misunderstand. To help you fully understand what it means for your health, we urge you to discuss this note with your provider.           Enzo Kaur MD  03/31/23 3465

## 2023-03-31 NOTE — ED TRIAGE NOTES
Patient to er from home with c/o started two days ago with ABD pain along with nausea and vomiting. Patient reported some diarrhea with this. Patient has mask on in triage along with staff.

## 2023-03-31 NOTE — PLAN OF CARE
Goal Outcome Evaluation:  Plan of Care Reviewed With: patient           Outcome Evaluation: admitted from PACU, O2 85%on room air, 91%on 2L. enc cdb. intructed on IS use. lap x 2 cdi and RAUL x 1 with mod sero-sang dng. amb to bathroom without diff but unable to void yet. kristopher ice chips and sips water, enc to slowly adv diet.

## 2023-03-31 NOTE — ED NOTES
Nursing report ED to floor  Patria Norris  42 y.o.  female    HPI :   Chief Complaint   Patient presents with    Abdominal Pain       Admitting doctor:   Amarilis Franklin MD    Admitting diagnosis:   The primary encounter diagnosis was Acute appendicitis with localized peritonitis without gangrene, unspecified whether abscess present, unspecified whether perforation present. A diagnosis of Calculus of gallbladder without cholecystitis without obstruction was also pertinent to this visit.    Code status:   Current Code Status       Date Active Code Status Order ID Comments User Context       Not on file            Allergies:   Patient has no known allergies.    Isolation:   No active isolations    Intake and Output    Intake/Output Summary (Last 24 hours) at 3/31/2023 1452  Last data filed at 3/31/2023 1428  Gross per 24 hour   Intake 1000 ml   Output --   Net 1000 ml       Weight:       03/31/23  1124   Weight: 115 kg (254 lb)       Most recent vitals:   Vitals:    03/31/23 1202 03/31/23 1203 03/31/23 1231 03/31/23 1401   BP: 135/83  130/78 112/74   Patient Position:       Pulse:  82 79 85   Resp:       Temp:       TempSrc:       SpO2:  97% 100% 97%   Weight:       Height:           Active LDAs/IV Access:   Lines, Drains & Airways       Active LDAs       Name Placement date Placement time Site Days    Peripheral IV 03/31/23 1142 Right Antecubital 03/31/23  1142  Antecubital  less than 1                    Labs (abnormal labs have a star):   Labs Reviewed   COMPREHENSIVE METABOLIC PANEL - Abnormal; Notable for the following components:       Result Value    Glucose 149 (*)     Total Bilirubin 1.4 (*)     All other components within normal limits    Narrative:     GFR Normal >60  Chronic Kidney Disease <60  Kidney Failure <15     URINALYSIS W/ MICROSCOPIC IF INDICATED (NO CULTURE) - Abnormal; Notable for the following components:    Color, UA Dark Yellow (*)     Ketones, UA Trace (*)     Blood, UA Large (3+) (*)      Protein, UA 30 mg/dL (1+) (*)     Leuk Esterase, UA Small (1+) (*)     All other components within normal limits   CBC WITH AUTO DIFFERENTIAL - Abnormal; Notable for the following components:    WBC 22.00 (*)     Neutrophil % 89.0 (*)     Lymphocyte % 4.0 (*)     Eosinophil % 0.0 (*)     Immature Grans % 1.0 (*)     Neutrophils, Absolute 19.58 (*)     Monocytes, Absolute 1.26 (*)     Immature Grans, Absolute 0.23 (*)     All other components within normal limits   URINALYSIS, MICROSCOPIC ONLY - Abnormal; Notable for the following components:    RBC, UA Too Numerous to Count (*)     WBC, UA 6-12 (*)     All other components within normal limits   LIPASE - Normal   PREGNANCY, URINE - Normal   BLOOD CULTURE   BLOOD CULTURE   RAINBOW DRAW    Narrative:     The following orders were created for panel order [place] Draw.  Procedure                               Abnormality         Status                     ---------                               -----------         ------                     Gold Top - SST[[id number]]                                   Final result               Light Blue Top[[id number]]                                   Final result                 Please view results for these tests on the individual orders.   LACTIC ACID, PLASMA   CBC AND DIFFERENTIAL    Narrative:     The following orders were created for panel order CBC & Differential.  Procedure                               Abnormality         Status                     ---------                               -----------         ------                     CBC Auto Differential[[id number]]        Abnormal            Final result                 Please view results for these tests on the individual orders.   GOLD TOP - SST   LIGHT BLUE TOP       EKG:   No orders to display       Meds given in ED:   Medications   sodium chloride 0.9 % flush 10 mL (has no administration in time range)   piperacillin-tazobactam (ZOSYN) 4.5 g in iso-osmotic dextrose 100 mL  IVPB (premix) (4.5 g Intravenous New Bag 3/31/23 1445)   sodium chloride 0.9 % infusion (200 mL/hr Intravenous New Bag 3/31/23 1445)   acetaminophen (TYLENOL) tablet 1,000 mg (1,000 mg Oral Given 3/31/23 1228)   sodium chloride 0.9 % bolus 1,000 mL (0 mL Intravenous Stopped 3/31/23 1428)   ondansetron (ZOFRAN) injection 4 mg (4 mg Intravenous Given 3/31/23 1228)   iopamidol (ISOVUE-300) 61 % injection 100 mL (85 mL Intravenous Given by Other 3/31/23 1345)       Imaging results:  CT Abdomen Pelvis With Contrast    Result Date: 3/31/2023  1. Extensive acute appendicitis. There is no evidence for rupture or abscess. 2. There is cholelithiasis without cholecystitis and there is no biliary dilatation.  Discussed with Dr. Kaur.       Ambulatory status:   - ad rodo    Social issues:   Social History     Socioeconomic History    Marital status: Single   Tobacco Use    Smoking status: Former     Types: Cigarettes    Smokeless tobacco: Never   Vaping Use    Vaping Use: Never used   Substance and Sexual Activity    Alcohol use: Not Currently    Drug use: Never    Sexual activity: Defer       NIH Stroke Scale:         Librado Barcenas RN  03/31/23 14:52 EDT

## 2023-03-31 NOTE — ANESTHESIA PROCEDURE NOTES
Airway  Urgency: elective    Date/Time: 3/31/2023 4:27 PM  Airway not difficult    General Information and Staff    Patient location during procedure: OR  CRNA/CAA: Narinder Schuster CRNA    Indications and Patient Condition  Indications for airway management: airway protection    Preoxygenated: yes  Mask difficulty assessment: 1 - vent by mask    Final Airway Details  Final airway type: endotracheal airway      Successful airway: ETT  Cuffed: yes   Successful intubation technique: direct laryngoscopy  Facilitating devices/methods: intubating stylet  Endotracheal tube insertion site: oral  Blade: Cecilia  Blade size: 3  ETT size (mm): 7.0  Cormack-Lehane Classification: grade I - full view of glottis  Placement verified by: chest auscultation and capnometry   Measured from: teeth  ETT/EBT  to teeth (cm): 21  Number of attempts at approach: 1  Assessment: lips, teeth, and gum same as pre-op and atraumatic intubation

## 2023-03-31 NOTE — OP NOTE
Operative Note :  Amarilis Franklin MD      [place]   1980    Procedure Date: 03/31/23    Pre-op Diagnosis:  Acute appendicitis with localized peritonitis without gangrene, unspecified whether abscess present, unspecified whether perforation present [K35.30]    Post-Operative Diagnosis:  Acute appendicitis with localized peritonitis with gangrene, perforation, without abscess    Procedure:   Laparoscopic appendectomy    Surgeon: Amarilis Franklin MD    Assistant: None    Anesthesia:  General (general endotracheal tube)    Estimated Blood Loss: minimal    Specimens: Appendix    Complications: None    Indications:  The patient is a 42 year-old lady who came to the emergency room this afternoon with acute onset right lower quadrant abdominal pain and CT findings suggestive of acute appendicitis with fairly intense periappendiceal fat stranding and inflammation concerning for perforation.  She understands the risks of laparoscopic appendectomy which I have proposed we perform this evening.  These risks include but are not limited to bleeding, wound infection, intra-abdominal abscess development, and damage to any other intra-abdominal structures such as the small bowel, colon, right ureter, etc.  Despite these risks, she has consented to proceed.    Findings: Feculent fluid in the right lower quadrant around inflamed appendix with distal perforation    Description of procedure:  The patient was brought to the operating room and placed on the OR table in supine position.  An endotracheal tube was inserted and general anesthesia induced.  A Austin catheter was inserted into the urinary bladder using sterile technique.  Her left arm was tucked at her side and the abdominal wall prepped and draped in a sterile fashion.  A surgical timeout was completed.  A supraumbilical skin incision was made after instilling 0.5% Marcaine with epinephrine.  The umbilical stalk was grasped and elevated and a longitudinal  fasciotomy made.  A Nicolas trocar was inserted and secured with 0 Vicryl stay sutures.  The abdomen was insufflated and under direct visualization I placed 2 additional 5 mm trocars in the suprapubic and left lower quadrant spaces.  The right lower quadrant was inspected and a small amount of feculent appearing fluid was pooling posterior to the cecum.  The appendix was located in a retrocecal location with the base of the appendix looking grossly normal but shortly beyond the base the appendix had evidence of early gangrene and full-thickness inflammation.  I was able to eventually identify the tip of the appendix along the posterior wall of the ascending colon.  This was grasped and elevated and blunt dissection was performed to separate the appendix off of the sidewall of the cecum.  I divided the appendiceal mesentery with the harmonic scalpel until all that remained was the gangrenous appearing appendix suspended off the cecum at the confluence of the tenia.  The appendix was transected there using a white load on the laparoscopic stapler adjacent to Treves ligament and the appendix passed off to pathology after removal from the peritoneal cavity in an Endo Catch bag.  The right lower quadrant was reinspected and copious warm normal saline was irrigated and suctioned dry.  A 19 Tajik RAUL drain was positioned through the left lower quadrant trocar site coursing into the pelvis and then up into the right lower quadrant adjacent to the staple line along the cecum.  The drain was secured to the skin with a 2-0 silk drain stitch.  All trocars were then removed and the abdomen desufflated.  The fascia at the umbilical trocar site was closed with 0 Vicryl figure-of-eight suture and all skin incisions closed with 4-0 Vicryl subcuticular suture and topical Exofin glue.  She was extubated, Austin catheter removed, and she transferred to PACU in stable condition with all counts correct per nursing.    Amarilis Franklin,  MD  General, Robotic, and Endoscopic Surgery  [facility]    [address], [address]  [place] [address]  P: [phone]  F: [phone]      No complaints

## 2023-04-01 PROBLEM — K35.30 ACUTE APPENDICITIS WITH LOCALIZED PERITONITIS WITHOUT GANGRENE, UNSPECIFIED WHETHER ABSCESS PRESENT, UNSPECIFIED WHETHER PERFORATION PRESENT: Status: ACTIVE | Noted: 2023-04-01

## 2023-04-01 LAB
ANION GAP SERPL CALCULATED.3IONS-SCNC: 13.1 MMOL/L (ref 5–15)
BASOPHILS # BLD AUTO: 0.05 10*3/MM3 (ref 0–0.2)
BASOPHILS NFR BLD AUTO: 0.3 % (ref 0–1.5)
BUN SERPL-MCNC: 9 MG/DL (ref 6–20)
BUN/CREAT SERPL: 8.7 (ref 7–25)
CALCIUM SPEC-SCNC: 8.7 MG/DL (ref 8.6–10.5)
CHLORIDE SERPL-SCNC: 99 MMOL/L (ref 98–107)
CO2 SERPL-SCNC: 24.9 MMOL/L (ref 22–29)
CREAT SERPL-MCNC: 1.04 MG/DL (ref 0.57–1)
DEPRECATED RDW RBC AUTO: 40.5 FL (ref 37–54)
EGFRCR SERPLBLD CKD-EPI 2021: 68.5 ML/MIN/1.73
EOSINOPHIL # BLD AUTO: 0.18 10*3/MM3 (ref 0–0.4)
EOSINOPHIL NFR BLD AUTO: 1.1 % (ref 0.3–6.2)
ERYTHROCYTE [DISTWIDTH] IN BLOOD BY AUTOMATED COUNT: 12.3 % (ref 12.3–15.4)
GLUCOSE SERPL-MCNC: 146 MG/DL (ref 65–99)
HCT VFR BLD AUTO: 38.2 % (ref 34–46.6)
HGB BLD-MCNC: 12.8 G/DL (ref 12–15.9)
IMM GRANULOCYTES # BLD AUTO: 0.07 10*3/MM3 (ref 0–0.05)
IMM GRANULOCYTES NFR BLD AUTO: 0.4 % (ref 0–0.5)
LYMPHOCYTES # BLD AUTO: 1.01 10*3/MM3 (ref 0.7–3.1)
LYMPHOCYTES NFR BLD AUTO: 6 % (ref 19.6–45.3)
MCH RBC QN AUTO: 30.2 PG (ref 26.6–33)
MCHC RBC AUTO-ENTMCNC: 33.5 G/DL (ref 31.5–35.7)
MCV RBC AUTO: 90.1 FL (ref 79–97)
MONOCYTES # BLD AUTO: 1.03 10*3/MM3 (ref 0.1–0.9)
MONOCYTES NFR BLD AUTO: 6.2 % (ref 5–12)
NEUTROPHILS NFR BLD AUTO: 14.38 10*3/MM3 (ref 1.7–7)
NEUTROPHILS NFR BLD AUTO: 86 % (ref 42.7–76)
NRBC BLD AUTO-RTO: 0 /100 WBC (ref 0–0.2)
PLATELET # BLD AUTO: 303 10*3/MM3 (ref 140–450)
PMV BLD AUTO: 10.1 FL (ref 6–12)
POTASSIUM SERPL-SCNC: 3.6 MMOL/L (ref 3.5–5.2)
RBC # BLD AUTO: 4.24 10*6/MM3 (ref 3.77–5.28)
SODIUM SERPL-SCNC: 137 MMOL/L (ref 136–145)
WBC NRBC COR # BLD: 16.72 10*3/MM3 (ref 3.4–10.8)

## 2023-04-01 PROCEDURE — 99024 POSTOP FOLLOW-UP VISIT: CPT | Performed by: SURGERY

## 2023-04-01 PROCEDURE — 25010000002 PIPERACILLIN SOD-TAZOBACTAM PER 1 G: Performed by: SURGERY

## 2023-04-01 PROCEDURE — 85025 COMPLETE CBC W/AUTO DIFF WBC: CPT | Performed by: SURGERY

## 2023-04-01 PROCEDURE — 80048 BASIC METABOLIC PNL TOTAL CA: CPT | Performed by: SURGERY

## 2023-04-01 RX ORDER — ACETAMINOPHEN 325 MG/1
650 TABLET ORAL EVERY 6 HOURS PRN
Status: DISCONTINUED | OUTPATIENT
Start: 2023-04-01 | End: 2023-04-07 | Stop reason: HOSPADM

## 2023-04-01 RX ADMIN — TAZOBACTAM SODIUM AND PIPERACILLIN SODIUM 3.38 G: 375; 3 INJECTION, SOLUTION INTRAVENOUS at 22:23

## 2023-04-01 RX ADMIN — TAZOBACTAM SODIUM AND PIPERACILLIN SODIUM 3.38 G: 375; 3 INJECTION, SOLUTION INTRAVENOUS at 15:52

## 2023-04-01 RX ADMIN — OXYCODONE AND ACETAMINOPHEN 1 TABLET: 5; 325 TABLET ORAL at 12:26

## 2023-04-01 RX ADMIN — OXYCODONE AND ACETAMINOPHEN 1 TABLET: 5; 325 TABLET ORAL at 22:23

## 2023-04-01 RX ADMIN — OXYCODONE AND ACETAMINOPHEN 1 TABLET: 5; 325 TABLET ORAL at 17:26

## 2023-04-01 RX ADMIN — ACETAMINOPHEN 650 MG: 325 TABLET, FILM COATED ORAL at 02:07

## 2023-04-01 RX ADMIN — TAZOBACTAM SODIUM AND PIPERACILLIN SODIUM 3.38 G: 375; 3 INJECTION, SOLUTION INTRAVENOUS at 10:36

## 2023-04-01 RX ADMIN — OXYCODONE AND ACETAMINOPHEN 1 TABLET: 5; 325 TABLET ORAL at 08:09

## 2023-04-01 RX ADMIN — POTASSIUM CHLORIDE, DEXTROSE MONOHYDRATE AND SODIUM CHLORIDE 100 ML/HR: 150; 5; 450 INJECTION, SOLUTION INTRAVENOUS at 15:58

## 2023-04-01 NOTE — PROGRESS NOTES
"General Surgery  Progress Note    CC: Follow-up perforated appendicitis    POD#1 laparoscopic appendectomy    S: She has been febrile up to 101.1 °F.  Her white blood cell count is somewhat better today at 16,000 from 22,000 yesterday.  She has been voiding well and denies any nausea or vomiting.    O:/73 (BP Location: Left arm, Patient Position: Lying)   Pulse 68   Temp 99 °F (37.2 °C) (Oral)   Resp 16   Ht 162.6 cm (64\")   Wt 115 kg (254 lb)   LMP 02/28/2023 (Approximate)   SpO2 97%   BMI 43.60 kg/m²     Intake & Output:  UOP: 150 mL + 1 void/24 hrs  RAUL: 155 mL/24 hrs, serosanguineous    GENERAL: alert, well appearing, and in no distress, appears flushed  HEENT: normocephalic, atraumatic, moist mucous membranes, clear sclerae   CHEST: clear to auscultation, no wheezes, rales or rhonchi, symmetric air entry  CARDIAC: regular rate and rhythm    ABDOMEN: Soft, nondistended, incisions clean/dry/intact with glue, RAUL output in left lower quadrant mostly serosanguineous with some purulence mixed in  EXTREMITIES: no cyanosis, clubbing, or edema   SKIN: Warm and moist, no rashes    LABS  Results from last 7 days   Lab Units 04/01/23  0630 03/31/23  1143   WBC 10*3/mm3 16.72* 22.00*   HEMOGLOBIN g/dL 12.8 14.1   HEMATOCRIT % 38.2 42.2   PLATELETS 10*3/mm3 303 337     Results from last 7 days   Lab Units 04/01/23  0630 03/31/23  1143   SODIUM mmol/L 137 137   POTASSIUM mmol/L 3.6 3.5   CHLORIDE mmol/L 99 101   CO2 mmol/L 24.9 25.3   BUN mg/dL 9 9   CREATININE mg/dL 1.04* 0.96   CALCIUM mg/dL 8.7 9.2   BILIRUBIN mg/dL  --  1.4*   ALK PHOS U/L  --  71   ALT (SGPT) U/L  --  18   AST (SGOT) U/L  --  13   GLUCOSE mg/dL 146* 149*             A/P: 42 y.o. female POD#1 laparoscopic appendectomy for perforated appendicitis    Continue Unasyn and RAUL drain.  Recheck CBC tomorrow morning.  She won't be ready for discharge home until her fevers and leukocytosis have resolved.    Amarilis Franklin MD  General, Robotic, and " Endoscopic Surgery  [facility]    [address], [address]  [place] [address]  P: [phone]  F: [phone]

## 2023-04-01 NOTE — PLAN OF CARE
Goal Outcome Evaluation:  Plan of Care Reviewed With: patient        Progress: improving  Outcome Evaluation: afebrile this afternoon. face flushed. IVF and IV abx continue.  enc IS and CDB, amb in torre and sitting in chair. lap x 2 cdi. RAUL x 1 with mod sero-sang dng.  voiding without diff. urine dark but clear. pain well controlled with Percocet.

## 2023-04-01 NOTE — PLAN OF CARE
Goal Outcome Evaluation:  Plan of Care Reviewed With: patient           Outcome Evaluation: VSS, tylenol given for temp of 101.1, on 2 LO2, voiding, freely, no c/o pain or nausea, IS up to 1000, lap sites CDI and ANTIONETTE, RAUL drain has sero sang drainage, IVF infusing, IV abx given, tolerating ice chips and sips of water,walked unit,   negative

## 2023-04-02 LAB
ANION GAP SERPL CALCULATED.3IONS-SCNC: 8.5 MMOL/L (ref 5–15)
BASOPHILS # BLD AUTO: 0.03 10*3/MM3 (ref 0–0.2)
BASOPHILS NFR BLD AUTO: 0.3 % (ref 0–1.5)
BUN SERPL-MCNC: 9 MG/DL (ref 6–20)
BUN/CREAT SERPL: 9.4 (ref 7–25)
CALCIUM SPEC-SCNC: 8.5 MG/DL (ref 8.6–10.5)
CHLORIDE SERPL-SCNC: 100 MMOL/L (ref 98–107)
CO2 SERPL-SCNC: 26.5 MMOL/L (ref 22–29)
CREAT SERPL-MCNC: 0.96 MG/DL (ref 0.57–1)
DEPRECATED RDW RBC AUTO: 38.8 FL (ref 37–54)
EGFRCR SERPLBLD CKD-EPI 2021: 75.4 ML/MIN/1.73
EOSINOPHIL # BLD AUTO: 0.13 10*3/MM3 (ref 0–0.4)
EOSINOPHIL NFR BLD AUTO: 1.1 % (ref 0.3–6.2)
ERYTHROCYTE [DISTWIDTH] IN BLOOD BY AUTOMATED COUNT: 11.9 % (ref 12.3–15.4)
GLUCOSE SERPL-MCNC: 144 MG/DL (ref 65–99)
HCT VFR BLD AUTO: 32.9 % (ref 34–46.6)
HGB BLD-MCNC: 11.5 G/DL (ref 12–15.9)
IMM GRANULOCYTES # BLD AUTO: 0.11 10*3/MM3 (ref 0–0.05)
IMM GRANULOCYTES NFR BLD AUTO: 0.9 % (ref 0–0.5)
LYMPHOCYTES # BLD AUTO: 1.31 10*3/MM3 (ref 0.7–3.1)
LYMPHOCYTES NFR BLD AUTO: 11 % (ref 19.6–45.3)
MCH RBC QN AUTO: 30.5 PG (ref 26.6–33)
MCHC RBC AUTO-ENTMCNC: 35 G/DL (ref 31.5–35.7)
MCV RBC AUTO: 87.3 FL (ref 79–97)
MONOCYTES # BLD AUTO: 0.91 10*3/MM3 (ref 0.1–0.9)
MONOCYTES NFR BLD AUTO: 7.7 % (ref 5–12)
NEUTROPHILS NFR BLD AUTO: 79 % (ref 42.7–76)
NEUTROPHILS NFR BLD AUTO: 9.4 10*3/MM3 (ref 1.7–7)
NRBC BLD AUTO-RTO: 0 /100 WBC (ref 0–0.2)
PLATELET # BLD AUTO: 258 10*3/MM3 (ref 140–450)
PMV BLD AUTO: 9.9 FL (ref 6–12)
POTASSIUM SERPL-SCNC: 3.8 MMOL/L (ref 3.5–5.2)
RBC # BLD AUTO: 3.77 10*6/MM3 (ref 3.77–5.28)
SODIUM SERPL-SCNC: 135 MMOL/L (ref 136–145)
WBC NRBC COR # BLD: 11.89 10*3/MM3 (ref 3.4–10.8)

## 2023-04-02 PROCEDURE — 99024 POSTOP FOLLOW-UP VISIT: CPT | Performed by: SURGERY

## 2023-04-02 PROCEDURE — 85025 COMPLETE CBC W/AUTO DIFF WBC: CPT | Performed by: SURGERY

## 2023-04-02 PROCEDURE — 25010000002 ONDANSETRON PER 1 MG: Performed by: SURGERY

## 2023-04-02 PROCEDURE — 25010000002 PIPERACILLIN SOD-TAZOBACTAM PER 1 G: Performed by: SURGERY

## 2023-04-02 PROCEDURE — 80048 BASIC METABOLIC PNL TOTAL CA: CPT | Performed by: SURGERY

## 2023-04-02 RX ADMIN — POTASSIUM CHLORIDE, DEXTROSE MONOHYDRATE AND SODIUM CHLORIDE 100 ML/HR: 150; 5; 450 INJECTION, SOLUTION INTRAVENOUS at 02:04

## 2023-04-02 RX ADMIN — Medication 10 ML: at 23:23

## 2023-04-02 RX ADMIN — ACETAMINOPHEN 650 MG: 325 TABLET, FILM COATED ORAL at 12:01

## 2023-04-02 RX ADMIN — ACETAMINOPHEN 650 MG: 325 TABLET, FILM COATED ORAL at 19:40

## 2023-04-02 RX ADMIN — OXYCODONE AND ACETAMINOPHEN 1 TABLET: 5; 325 TABLET ORAL at 04:40

## 2023-04-02 RX ADMIN — TAZOBACTAM SODIUM AND PIPERACILLIN SODIUM 3.38 G: 375; 3 INJECTION, SOLUTION INTRAVENOUS at 23:23

## 2023-04-02 RX ADMIN — ONDANSETRON 4 MG: 2 INJECTION INTRAMUSCULAR; INTRAVENOUS at 04:40

## 2023-04-02 RX ADMIN — TAZOBACTAM SODIUM AND PIPERACILLIN SODIUM 3.38 G: 375; 3 INJECTION, SOLUTION INTRAVENOUS at 14:26

## 2023-04-02 RX ADMIN — TAZOBACTAM SODIUM AND PIPERACILLIN SODIUM 3.38 G: 375; 3 INJECTION, SOLUTION INTRAVENOUS at 06:13

## 2023-04-02 NOTE — PLAN OF CARE
Goal Outcome Evaluation:  Plan of Care Reviewed With: patient        Progress: improving  Outcome Evaluation: VSS, lap sites X 2 are ANTIONETTE and CDI, RAUL X 1 w/ serosanguineous output, ambulated in hallway w/ staff, IV antibiotic as ordered, IVF infusing, voiding freely, Percocet given for c/o pain w/ relief, zofran given for nausea w/ relief

## 2023-04-02 NOTE — PROGRESS NOTES
"General Surgery  Progress Note    CC: Follow-up perforated appendicitis    POD#2 laparoscopic appendectomy    S: Her fevers have resolved.  She has developed mild nausea without vomiting.  Her pain is well controlled.    O:/78 (BP Location: Left arm, Patient Position: Lying)   Pulse 102   Temp 98.5 °F (36.9 °C) (Oral)   Resp 16   Ht 162.6 cm (64\")   Wt 115 kg (254 lb)   LMP 02/28/2023 (Approximate)   SpO2 95%   BMI 43.60 kg/m²     Intake & Output:  UOP: 1150 ml/24 hrs  RAUL: 90 mL/24 hrs, serosanguineous    GENERAL: alert, well appearing, and in no distress, appears flushed  HEENT: normocephalic, atraumatic, moist mucous membranes, clear sclerae   CHEST: clear to auscultation, no wheezes, rales or rhonchi, symmetric air entry  CARDIAC: regular rate and rhythm    ABDOMEN: Soft, nondistended, incisions clean/dry/intact with glue, RAUL output in left lower quadrant serosanguineous  EXTREMITIES: no cyanosis, clubbing, or edema   SKIN: Warm and moist, no rashes    LABS  Results from last 7 days   Lab Units 04/02/23  0459 04/01/23  0630 03/31/23  1143   WBC 10*3/mm3 11.89* 16.72* 22.00*   HEMOGLOBIN g/dL 11.5* 12.8 14.1   HEMATOCRIT % 32.9* 38.2 42.2   PLATELETS 10*3/mm3 258 303 337     Results from last 7 days   Lab Units 04/02/23 0459 04/01/23  0630 03/31/23  1143   SODIUM mmol/L 135* 137 137   POTASSIUM mmol/L 3.8 3.6 3.5   CHLORIDE mmol/L 100 99 101   CO2 mmol/L 26.5 24.9 25.3   BUN mg/dL 9 9 9   CREATININE mg/dL 0.96 1.04* 0.96   CALCIUM mg/dL 8.5* 8.7 9.2   BILIRUBIN mg/dL  --   --  1.4*   ALK PHOS U/L  --   --  71   ALT (SGPT) U/L  --   --  18   AST (SGOT) U/L  --   --  13   GLUCOSE mg/dL 144* 146* 149*             A/P: 42 y.o. female POD#2 laparoscopic appendectomy for perforated appendicitis    Continue antibiotics and recheck CBC in the morning.  Her nausea is concerning for possible early ileus development and I encouraged her to keep walking as much as she is able.  If she feels better by tomorrow " and her white blood cell count has normalized, she can likely be discharged home tomorrow afternoon.    Amarilis Franklin MD  General, Robotic, and Endoscopic Surgery  [facility]    [address], [address]  [place] [address]  P: [phone]  F: [phone]

## 2023-04-02 NOTE — PLAN OF CARE
Goal Outcome Evaluation:  Plan of Care Reviewed With: patient        Progress: improving  Outcome Evaluation: Lap sites CD&I, RAUL x 1 with serosanguinous output. C/o minimal pain, requesting Tylenol x 1 this shift. Tolerating regular diet. Continue IV antibiotic, VSS, WBC decreased today. Needing encouragement to get OOB, up in chair this afternoon. Encouraged use of IS. Anticipate discharge home tomorrow.

## 2023-04-03 LAB
BASOPHILS # BLD AUTO: 0.06 10*3/MM3 (ref 0–0.2)
BASOPHILS NFR BLD AUTO: 0.4 % (ref 0–1.5)
DEPRECATED RDW RBC AUTO: 39.6 FL (ref 37–54)
EOSINOPHIL # BLD AUTO: 0.11 10*3/MM3 (ref 0–0.4)
EOSINOPHIL NFR BLD AUTO: 0.8 % (ref 0.3–6.2)
ERYTHROCYTE [DISTWIDTH] IN BLOOD BY AUTOMATED COUNT: 12.3 % (ref 12.3–15.4)
HCT VFR BLD AUTO: 33.7 % (ref 34–46.6)
HGB BLD-MCNC: 11.8 G/DL (ref 12–15.9)
IMM GRANULOCYTES # BLD AUTO: 0.14 10*3/MM3 (ref 0–0.05)
IMM GRANULOCYTES NFR BLD AUTO: 1 % (ref 0–0.5)
LAB AP CASE REPORT: NORMAL
LYMPHOCYTES # BLD AUTO: 1.55 10*3/MM3 (ref 0.7–3.1)
LYMPHOCYTES NFR BLD AUTO: 10.8 % (ref 19.6–45.3)
MCH RBC QN AUTO: 30.7 PG (ref 26.6–33)
MCHC RBC AUTO-ENTMCNC: 35 G/DL (ref 31.5–35.7)
MCV RBC AUTO: 87.8 FL (ref 79–97)
MONOCYTES # BLD AUTO: 1.38 10*3/MM3 (ref 0.1–0.9)
MONOCYTES NFR BLD AUTO: 9.6 % (ref 5–12)
NEUTROPHILS NFR BLD AUTO: 11.15 10*3/MM3 (ref 1.7–7)
NEUTROPHILS NFR BLD AUTO: 77.4 % (ref 42.7–76)
NRBC BLD AUTO-RTO: 0 /100 WBC (ref 0–0.2)
PATH REPORT.FINAL DX SPEC: NORMAL
PATH REPORT.GROSS SPEC: NORMAL
PLATELET # BLD AUTO: 309 10*3/MM3 (ref 140–450)
PMV BLD AUTO: 10.3 FL (ref 6–12)
RBC # BLD AUTO: 3.84 10*6/MM3 (ref 3.77–5.28)
WBC NRBC COR # BLD: 14.39 10*3/MM3 (ref 3.4–10.8)

## 2023-04-03 PROCEDURE — 25010000002 PIPERACILLIN SOD-TAZOBACTAM PER 1 G: Performed by: SURGERY

## 2023-04-03 PROCEDURE — 99024 POSTOP FOLLOW-UP VISIT: CPT | Performed by: SURGERY

## 2023-04-03 PROCEDURE — 85025 COMPLETE CBC W/AUTO DIFF WBC: CPT | Performed by: SURGERY

## 2023-04-03 RX ADMIN — Medication 10 ML: at 21:59

## 2023-04-03 RX ADMIN — ACETAMINOPHEN 650 MG: 325 TABLET, FILM COATED ORAL at 08:33

## 2023-04-03 RX ADMIN — ACETAMINOPHEN 650 MG: 325 TABLET, FILM COATED ORAL at 14:56

## 2023-04-03 RX ADMIN — TAZOBACTAM SODIUM AND PIPERACILLIN SODIUM 3.38 G: 375; 3 INJECTION, SOLUTION INTRAVENOUS at 23:21

## 2023-04-03 RX ADMIN — TAZOBACTAM SODIUM AND PIPERACILLIN SODIUM 3.38 G: 375; 3 INJECTION, SOLUTION INTRAVENOUS at 14:56

## 2023-04-03 RX ADMIN — ACETAMINOPHEN 650 MG: 325 TABLET, FILM COATED ORAL at 21:59

## 2023-04-03 RX ADMIN — TAZOBACTAM SODIUM AND PIPERACILLIN SODIUM 3.38 G: 375; 3 INJECTION, SOLUTION INTRAVENOUS at 07:35

## 2023-04-03 NOTE — PAYOR COMM NOTE
"Mago Norirs (42 y.o. Female)       INPATIENT REQUEST FOR MEMBER # [id number]  THE PATIENT WAS OBS 3/31/23 AND CONVERTED TO IP 4/1/23    CONTACT ELISEO RADHAREMY  P# [phone]  F# [phone]          Date of Birth   1980    Social Security Number       Address   [address] [place] [address]  [facility]    Home Phone   [phone]    MRN   [id number]       Hinduism   Protestant    Marital Status   Single                            Admission Date   3/31/23    Admission Type   Emergency    Admitting Provider   Amarilis [place] MD    Attending Provider   Amarilis [place] MD    Department, Room/Bed   [address], [facility]/       Discharge Date       Discharge Disposition       Discharge Destination                               Attending Provider: Amarilis [place] MD    Allergies: No Known Allergies    Isolation: None   Infection: None   Code Status: CPR    Ht: 162.6 cm (64\")   Wt: 115 kg (254 lb)    Admission Cmt: None   Principal Problem: Acute appendicitis with perforation, localized peritonitis, and gangrene, without abscess [K35.32]                 Active Insurance as of 3/31/2023     Primary Coverage     Payor Plan Insurance Group Employer/Plan Group    [employer]HEM BLUE CROSS ANTHEM BLUE CROSS BLUE SHIELD PPO [id number]     Payor Plan Address Payor Plan Phone Number Payor Plan Fax Number Effective Dates    [address] [phone]  1/1/2021 - None Entered    [place] [address]       Subscriber Name Subscriber Birth Date Member ID       MAGO NORRIS 1980 [id number]                 Emergency Contacts      (Rel.) Home Phone Work Phone Mobile Phone    Marva Norris (Mother) -- -- [phone]               History & Physical      Amarilis [place] MD at 03/31/23 1430          General Surgery H&P    CC: Abdominal pain    HPI:   The patient is a very pleasant 42 y.o. female that presented to the hospital with acute onset right lower quadrant abdominal pain without " radiation that started 36 hours ago with associated nausea and vomiting.  She has had subjective fever and chills.  The pain has been constant and rapidly worsening in severity.  She came to the emergency room where a CT abdomen/pelvis demonstrated extensive acute appendicitis.    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    Family History: Noncontributory    Review of Systems:  A comprehensive review of systems was negative except for the following positives: Abdominal pain, nausea, vomiting, fever, and chills    Physical Exam:   Vitals:    03/31/23 1401   BP: 112/74   Pulse: 85   Resp:    Temp:    SpO2: 97%     Height: 162 cm  Weight: 115 kg  BMI: 43.6  GENERAL: no acute distress, awake and alert  HEENT: normocephalic, atraumatic, no scleral icterus, moist mucous membranes  NECK: Supple, there is no thyromegaly or lymphadenopathy  RESPIRATORY: clear to auscultation bilaterally, no wheezes  CARDIOVASCULAR: regular rate and rhythm   GASTROINTESTINAL: Soft, nondistended, focal right lower quadrant and to a lesser extent right upper quadrant tenderness to palpation without rebound or guarding  MUSCULOSKELETAL: no cyanosis, clubbing, or edema  NEUROLOGIC: alert and oriented, normal speech, cranial nerves 2-12 grossly intact, no focal deficits  SKIN: Moist, warm, no rashes, no jaundice    Diagnostic workup:     Pertinent labs:   Results from last 7 days   Lab Units 03/31/23  1143   WBC 10*3/mm3 22.00*   HEMOGLOBIN g/dL 14.1   HEMATOCRIT % 42.2   PLATELETS 10*3/mm3 337     Results from last 7 days   Lab Units 03/31/23  1143   SODIUM mmol/L 137   POTASSIUM mmol/L 3.5   CHLORIDE mmol/L 101   CO2 mmol/L 25.3   BUN mg/dL 9   CREATININE mg/dL 0.96   CALCIUM mg/dL 9.2   BILIRUBIN mg/dL 1.4*   ALK PHOS U/L 71   ALT (SGPT) U/L 18   AST (SGOT) U/L 13    GLUCOSE mg/dL 149*       IMAGING:  CT ABD/PELVIS:  IMPRESSION:  1. Extensive acute appendicitis. There is no evidence for rupture or abscess.  2. There is cholelithiasis without cholecystitis and there is no biliary dilatation.    Assessment and plan:     The patient is a 42 y.o. female with acute appendicitis    I reviewed her CT scan and blood work and I recommended we admit her to the hospital for observation, begin her on empiric antibiotics, and go to the operating room tonight for laparoscopic appendectomy.  She understands if the appendix is found to be ruptured with purulent peritonitis a drain will be left in place and she will be kept for a few days of postoperative antibiotics.  If all goes well and the appendix has not ruptured, she will likely be stable for discharge home tomorrow.  We discussed other risks of the surgery which include bleeding, wound infection, intra-abdominal abscess development, and damage to surrounding structures such as the right ureter, small bowel, or cecum.  Despite these risks, she has consented to proceed.    Amarilis [place] MD  General, Robotic, and Endoscopic Surgery  [facility]    [address] [place] [address], [address]  [place] [address]  P: [phone]  F: [phone]       Electronically signed by Amarilis [place] MD at 03/31/23 1515          Operative/Procedure Notes (last 7 days)      Amarilis [place] MD at 03/31/23 1641        Operative Note :  Amarilis [place] MD Amanda   1980    Procedure Date: 03/31/23    Pre-op Diagnosis:  Acute appendicitis with localized peritonitis without gangrene, unspecified whether abscess present, unspecified whether perforation present [K35.30]    Post-Operative Diagnosis:  Acute appendicitis with localized peritonitis with gangrene, perforation, without abscess    Procedure:   Laparoscopic appendectomy    Surgeon: Amarilis [place] MD    Assistant: None    Anesthesia:  General (general endotracheal  tube)    Estimated Blood Loss: minimal    Specimens: Appendix    Complications: None    Indications:  The patient is a 42 year-old lady who came to the emergency room this afternoon with acute onset right lower quadrant abdominal pain and CT findings suggestive of acute appendicitis with fairly intense periappendiceal fat stranding and inflammation concerning for perforation.  She understands the risks of laparoscopic appendectomy which I have proposed we perform this evening.  These risks include but are not limited to bleeding, wound infection, intra-abdominal abscess development, and damage to any other intra-abdominal structures such as the small bowel, colon, right ureter, etc.  Despite these risks, she has consented to proceed.    Findings: Feculent fluid in the right lower quadrant around inflamed appendix with distal perforation    Description of procedure:  The patient was brought to the operating room and placed on the OR table in supine position.  An endotracheal tube was inserted and general anesthesia induced.  A Austin catheter was inserted into the urinary bladder using sterile technique.  Her left arm was tucked at her side and the abdominal wall prepped and draped in a sterile fashion.  A surgical timeout was completed.  A supraumbilical skin incision was made after instilling 0.5% Marcaine with epinephrine.  The umbilical stalk was grasped and elevated and a longitudinal fasciotomy made.  A Nicolas trocar was inserted and secured with 0 Vicryl stay sutures.  The abdomen was insufflated and under direct visualization I placed 2 additional 5 mm trocars in the suprapubic and left lower quadrant spaces.  The right lower quadrant was inspected and a small amount of feculent appearing fluid was pooling posterior to the cecum.  The appendix was located in a retrocecal location with the base of the appendix looking grossly normal but shortly beyond the base the appendix had evidence of early gangrene and  full-thickness inflammation.  I was able to eventually identify the tip of the appendix along the posterior wall of the ascending colon.  This was grasped and elevated and blunt dissection was performed to separate the appendix off of the sidewall of the cecum.  I divided the appendiceal mesentery with the harmonic scalpel until all that remained was the gangrenous appearing appendix suspended off the cecum at the confluence of the tenia.  The appendix was transected there using a white load on the laparoscopic stapler adjacent to Treves ligament and the appendix passed off to pathology after removal from the peritoneal cavity in an Endo Catch bag.  The right lower quadrant was reinspected and copious warm normal saline was irrigated and suctioned dry.  A 19 Georgian RAUL drain was positioned through the left lower quadrant trocar site coursing into the pelvis and then up into the right lower quadrant adjacent to the staple line along the cecum.  The drain was secured to the skin with a 2-0 silk drain stitch.  All trocars were then removed and the abdomen desufflated.  The fascia at the umbilical trocar site was closed with 0 Vicryl figure-of-eight suture and all skin incisions closed with 4-0 Vicryl subcuticular suture and topical Exofin glue.  She was extubated, Austin catheter removed, and she transferred to PACU in stable condition with all counts correct per nursing.    Amarilis [place] MD  General, Robotic, and Endoscopic Surgery  [facility]    [address], [address]  [place] [address]  P: [phone]  F: [phone]       Electronically signed by Amarilis [place] MD at 03/31/23 2760          Physician Progress Notes (last 72 hours)      Amarilis [place] MD at 04/02/23 1005          General Surgery  Progress Note    CC: Follow-up perforated appendicitis    POD#2 laparoscopic appendectomy    S: Her fevers have resolved.  She has developed mild nausea without vomiting.  Her pain is  "well controlled.    O:/78 (BP Location: Left arm, Patient Position: Lying)   Pulse 102   Temp 98.5 °F (36.9 °C) (Oral)   Resp 16   Ht 162.6 cm (64\")   Wt 115 kg (254 lb)   LMP 02/28/2023 (Approximate)   SpO2 95%   BMI 43.60 kg/m²     Intake & Output:  UOP: 1150 ml/24 hrs  RAUL: 90 mL/24 hrs, serosanguineous    GENERAL: alert, well appearing, and in no distress, appears flushed  HEENT: normocephalic, atraumatic, moist mucous membranes, clear sclerae   CHEST: clear to auscultation, no wheezes, rales or rhonchi, symmetric air entry  CARDIAC: regular rate and rhythm    ABDOMEN: Soft, nondistended, incisions clean/dry/intact with glue, RAUL output in left lower quadrant serosanguineous  EXTREMITIES: no cyanosis, clubbing, or edema   SKIN: Warm and moist, no rashes    LABS  Results from last 7 days   Lab Units 04/02/23 0459 04/01/23  0630 03/31/23  1143   WBC 10*3/mm3 11.89* 16.72* 22.00*   HEMOGLOBIN g/dL 11.5* 12.8 14.1   HEMATOCRIT % 32.9* 38.2 42.2   PLATELETS 10*3/mm3 258 303 337     Results from last 7 days   Lab Units 04/02/23 0459 04/01/23  0630 03/31/23  1143   SODIUM mmol/L 135* 137 137   POTASSIUM mmol/L 3.8 3.6 3.5   CHLORIDE mmol/L 100 99 101   CO2 mmol/L 26.5 24.9 25.3   BUN mg/dL 9 9 9   CREATININE mg/dL 0.96 1.04* 0.96   CALCIUM mg/dL 8.5* 8.7 9.2   BILIRUBIN mg/dL  --   --  1.4*   ALK PHOS U/L  --   --  71   ALT (SGPT) U/L  --   --  18   AST (SGOT) U/L  --   --  13   GLUCOSE mg/dL 144* 146* 149*             A/P: 42 y.o. female POD#2 laparoscopic appendectomy for perforated appendicitis    Continue antibiotics and recheck CBC in the morning.  Her nausea is concerning for possible early ileus development and I encouraged her to keep walking as much as she is able.  If she feels better by tomorrow and her white blood cell count has normalized, she can likely be discharged home tomorrow afternoon.    Amarilis [place] MD  General, Robotic, and Endoscopic Surgery  [facility]    [address], [address]  [place] [address]  P: [phone]  F: [phone]       Electronically signed by Amarilis [place] MD at 04/02/23 1007     Amarilis [place] MD at 04/01/23 0834          General Surgery  Progress Note    CC: Follow-up perforated appendicitis    POD#1 laparoscopic appendectomy    S: She has been febrile up to 101.1 °F.  Her white blood cell count is somewhat better today at 16,000 from 22,000 yesterday.  She has been voiding well and denies any nausea or vomiting.    O:/73 (BP Location: Left arm, Patient Position: Lying)   Pulse 68   Temp 99 °F (37.2 °C) (Oral)   Resp 16   Ht 162.6 cm (64\")   Wt 115 kg (254 lb)   LMP 02/28/2023 (Approximate)   SpO2 97%   BMI 43.60 kg/m²     Intake & Output:  UOP: 150 mL + 1 void/24 hrs  RAUL: 155 mL/24 hrs, serosanguineous    GENERAL: alert, well appearing, and in no distress, appears flushed  HEENT: normocephalic, atraumatic, moist mucous membranes, clear sclerae   CHEST: clear to auscultation, no wheezes, rales or rhonchi, symmetric air entry  CARDIAC: regular rate and rhythm    ABDOMEN: Soft, nondistended, incisions clean/dry/intact with glue, RAUL output in left lower quadrant mostly serosanguineous with some purulence mixed in  EXTREMITIES: no cyanosis, clubbing, or edema   SKIN: Warm and moist, no rashes    LABS  Results from last 7 days   Lab Units 04/01/23  0630 03/31/23  1143   WBC 10*3/mm3 16.72* 22.00*   HEMOGLOBIN g/dL 12.8 14.1   HEMATOCRIT % 38.2 42.2   PLATELETS 10*3/mm3 303 337     Results from last 7 days   Lab Units 04/01/23  0630 03/31/23  1143   SODIUM mmol/L 137 137   POTASSIUM mmol/L 3.6 3.5   CHLORIDE mmol/L 99 101   CO2 mmol/L 24.9 25.3   BUN mg/dL 9 9   CREATININE mg/dL 1.04* 0.96   CALCIUM mg/dL 8.7 9.2   BILIRUBIN mg/dL  --  1.4*   ALK PHOS U/L  --  71   ALT (SGPT) U/L  --  18   AST (SGOT) U/L  --  13   GLUCOSE mg/dL 146* 149*             A/P: 42 y.o. female POD#1 laparoscopic appendectomy for " perforated appendicitis    Continue Unasyn and RAUL drain.  Recheck CBC tomorrow morning.  She won't be ready for discharge home until her fevers and leukocytosis have resolved.    Amarilis [place] MD  General, Robotic, and Endoscopic Surgery  [facility]    [address], [address]  [place] [address]  P: [phone]  F: [phone]       Electronically signed by Amarilis [place] MD at 04/01/23 0840       Consult Notes (last 72 hours)  Notes from 03/31/23 0740 through 04/03/23 0740   No notes of this type exist for this encounter.        All medication doses during the admission are shown, including meds that are no longer on order.  Scheduled Meds Sorted by Name  for Patria Norris as of 4/1/23 through 4/3/23    1 Day 3 Days 7 Days 10 Days < Today >   Legend:                          Inactive     Active     Other Encounter     Linked                 Medications 04/01/23 04/02/23 04/03/23   acetaminophen (TYLENOL) tablet 1,000 mg  Dose: 1,000 mg  Freq: Once Route: PO  Start: 03/31/23 1223 End: 03/31/23 1228   Admin Instructions:   Based on patient request - if ordered for moderate or severe pain, provider allows for administration of a medication prescribed for a lower pain scale.  Do not exceed 4 grams of acetaminophen in a 24 hr period. Max dose of 2gm for AST/ALT greater than 120 units/L    If given for fever, use fever parameter: fever greater than 100.4 °F.    If given for pain, use the following pain scale:   Mild Pain = Pain Score of 1-3, CPOT 1-2  Moderate Pain = Pain Score of 4-6, CPOT 3-4  Severe Pain = Pain Score of 7-10, CPOT 5-8         ceFAZolin in dextrose (ANCEF) IVPB solution 2 g  Dose: 2 g  Freq: Once Route: IV  Indications of Use: INTRA-ABDOMINAL INFECTION,PERIOPERATIVE PHARMACOPROPHYLAXIS  Last Dose: Stopped (03/31/23 1823)  Start: 03/31/23 1538 End: 03/31/23 1823   Admin Instructions:   Caution: Look alike/sound alike drug alert         famotidine (PEPCID) injection 20  "mg  Dose: 20 mg  Freq: Once Route: IV  Start: 03/31/23 1551 End: 03/31/23 1604   Admin Instructions:   Give IV push over 2 minutes.         gabapentin (NEURONTIN) capsule 600 mg  Dose: 600 mg  Freq: Once Route: PO  Start: 03/31/23 1551 End: 03/31/23 1604   Admin Instructions:            iopamidol (ISOVUE-300) 61 % injection 100 mL  Dose: 100 mL  Freq: Once in Imaging Route: IV  Start: 03/31/23 1346 End: 03/31/23 1345         ondansetron (ZOFRAN) injection 4 mg  Dose: 4 mg  Freq: Once Route: IV  Start: 03/31/23 1223 End: 03/31/23 1228   Admin Instructions:   \"If multiple N/V medications ordered, use in the following order: Ondansetron, Prochlorperazine, Promethazine. Use PO unless patient refuses or patient unable to swallow.\"         piperacillin-tazobactam (ZOSYN) 3.375 g in iso-osmotic dextrose 50 ml (premix)  Dose: 3.375 g  Freq: Every 8 Hours Route: IV  Indications of Use: INTRA-ABDOMINAL INFECTION  Start: 03/31/23 2300 End: 04/07/23 2259   Admin Instructions:   Refrigerate    1036   1552   2223      0613   1426   2323         0735   1500     2300          piperacillin-tazobactam (ZOSYN) 4.5 g in iso-osmotic dextrose 100 mL IVPB (premix)  Dose: 4.5 g  Freq: Once Route: IV  Indications of Use: INTRA-ABDOMINAL INFECTION  Last Dose: Stopped (03/31/23 1518)  Start: 03/31/23 1359 End: 03/31/23 1518   Admin Instructions:   Refrigerate         sodium chloride 0.9 % bolus 1,000 mL  Dose: 1,000 mL  Freq: Once Route: IV  Last Dose: Stopped (03/31/23 1428)  Start: 03/31/23 1223 End: 03/31/23 1428         sodium chloride 0.9 % flush 10 mL  Dose: 10 mL  Freq: Every 12 Hours Scheduled Route: IV  Start: 03/31/23 2100    0803)             2323       0900   2100         sodium chloride 0.9 % flush 3 mL  Dose: 3 mL  Freq: Every 12 Hours Scheduled Route: IV  Start: 03/31/23 2100 End: 03/31/23 1733         Medications 04/01/23 04/02/23 04/03/23         Continuous Meds Sorted by Name  for Patria as of 4/1/23 through " 4/3/23  Legend:                          Inactive     Active     Other Encounter     Linked                 Medications 04/01/23 04/02/23 04/03/23   dextrose 5 % and sodium chloride 0.45 % with KCl 20 mEq/L infusion  Rate: 100 mL/hr Dose: 100 mL/hr  Freq: Continuous Route: IV  Last Dose: Stopped (04/02/23 1020)  Start: 03/31/23 1915 End: 04/02/23 1005    1558        0203   0204   1005-D/C'd  1020         lactated ringers infusion  Rate: 9 mL/hr Dose: 9 mL/hr  Freq: Continuous Route: IV  Last Dose: Stopped (03/31/23 1900)  Start: 03/31/23 1551 End: 03/31/23 1822         sodium chloride 0.9 % infusion  Rate: 200 mL/hr Dose: 200 mL/hr  Freq: Continuous Route: IV  Last Dose: Stopped (03/31/23 1846)  Start: 03/31/23 1408 End: 03/31/23 1822               PRN Meds Sorted by Name  for Patria Norris as of 4/1/23 through 4/3/23  Legend:                          Inactive     Active     Other Encounter     Linked                 Medications 04/01/23 04/02/23 04/03/23   acetaminophen (TYLENOL) tablet 650 mg  Dose: 650 mg  Freq: Every 6 Hours PRN Route: PO  PRN Reason: Mild Pain  Start: 04/01/23 0202   Admin Instructions:   Based on patient request - if ordered for moderate or severe pain, provider allows for administration of a medication prescribed for a lower pain scale.  Do not exceed 4 grams of acetaminophen in a 24 hr period. Max dose of 2gm for AST/ALT greater than 120 units/L    If given for fever, use fever parameter: fever greater than 100.4 °F.    If given for pain, use the following pain scale:   Mild Pain = Pain Score of 1-3, CPOT 1-2  Moderate Pain = Pain Score of 4-6, CPOT 3-4  Severe Pain = Pain Score of 7-10, CPOT 5-8    0207        1201   1940          bupivacaine-EPINEPHrine PF (MARCAINE w/EPI) 0.5% [id number] injection  Freq: As Needed  Start: 03/31/23 1646 End: 03/31/23 1721         dexamethasone sodium phosphate injection  Freq: As Needed Route: IV  Start: 03/31/23 1631 End: 03/31/23 1725          diphenhydrAMINE (BENADRYL) injection 12.5 mg  Dose: 12.5 mg  Freq: Every 15 Minutes PRN Route: IV  PRN Reason: Itching  PRN Comment: May repeat x 1  Start: 03/31/23 1712 End: 03/31/23 1821   Admin Instructions:   Caution: Look alike/sound alike drug alert. This med may be ordered in other forms and routes. Before giving verify the last time the drug was given by any route/form.          droperidol (INAPSINE) injection 0.625 mg  Dose: 0.625 mg  Freq: Every 20 Minutes PRN Route: IV  PRN Reasons: Nausea,Vomiting  Indications of Use: NAUSEA AND VOMITING  Start: 03/31/23 1712 End: 03/31/23 1821   Admin Instructions:   Use ondansetron first; proceed to droperidol for nausea refractory to ondansetron.         Or  droperidol (INAPSINE) injection 0.625 mg  Dose: 0.625 mg  Freq: Every 20 Minutes PRN Route: IM  PRN Reasons: Nausea,Vomiting  Start: 03/31/23 1712 End: 03/31/23 1821   Admin Instructions:   Use ondansetron first; proceed to droperidol for nausea refractory to ondansetron.         ePHEDrine injection 5 mg  Dose: 5 mg  Freq: Once As Needed Route: IV  PRN Comment: symptomatic hypotension - Notify attending anesthesiologist if this needs to be given  Start: 03/31/23 1712 End: 03/31/23 1821   Admin Instructions:   Caution: Look alike/sound alike drug alert   Dilute with NS to 5-10 mg/mL.  Central line preferred, if unavailable use large bore IV access with frequent nurse monitoring of IV site.         fentaNYL citrate (PF) (SUBLIMAZE) injection  Freq: As Needed Route: IV  Start: 03/31/23 1624 End: 03/31/23 1720         fentaNYL citrate (PF) (SUBLIMAZE) injection 50 mcg  Dose: 50 mcg  Freq: Every 5 Minutes PRN Route: IV  PRN Reason: Severe Pain  Start: 03/31/23 1712 End: 03/31/23 1821   Admin Instructions:   Maximum total dose of fentanyl is 200 mcg.  If given for pain, use the following pain scale:  Mild Pain = Pain Score of 1-3, CPOT 1-2  Moderate Pain = Pain Score of 4-6, CPOT 3-4  Severe Pain = Pain Score of 7-10,  CPOT 5-8         fentaNYL citrate (PF) (SUBLIMAZE) injection 50 mcg  Dose: 50 mcg  Freq: Every 10 Minutes PRN Route: IV  PRN Reason: Severe Pain  Start: 03/31/23 1549 End: 03/31/23 1733   Admin Instructions:   Maximum total dose of fentanyl is 100 mcg.  If given for pain, use the following pain scale:  Mild Pain = Pain Score of 1-3, CPOT 1-2  Moderate Pain = Pain Score of 4-6, CPOT 3-4  Severe Pain = Pain Score of 7-10, CPOT 5-8         flumazenil (ROMAZICON) injection 0.2 mg  Dose: 0.2 mg  Freq: As Needed Route: IV  PRN Comment: for benzodiazepine induced unresponsiveness or sedation  Indications of Use: BENZODIAZEPINE-INDUCED SEDATION  Start: 03/31/23 1712 End: 03/31/23 1821   Admin Instructions:   Notify Anesthesia if given  ** give IV over 15-30 seconds **         hydrALAZINE (APRESOLINE) injection 5 mg  Dose: 5 mg  Freq: Every 10 Minutes PRN Route: IV  PRN Reason: High Blood Pressure  PRN Comment: for systolic blood pressure greater than 180 mmHg or diastolic blood pressure greater than 105 mmHg  Start: 03/31/23 1712 End: 03/31/23 1821   Admin Instructions:   Up to 20 mg. If labetalol and hydralazine are both ordered, use labetalol first.  Caution: Look alike/sound alike drug alert         HYDROcodone-acetaminophen (NORCO) 7.5-325 MG per tablet 1 tablet  Dose: 1 tablet  Freq: Once As Needed Route: PO  PRN Reason: Moderate Pain  Start: 03/31/23 1712 End: 03/31/23 1821   Admin Instructions:   [RAHUL]    Do not exceed 4 grams of acetaminophen in a 24 hr period. Max dose of 2gm for AST/ALT greater than 120 units/L        If given for pain, use the following pain scale:   Mild Pain = Pain Score of 1-3, CPOT 1-2  Moderate Pain = Pain Score of 4-6, CPOT 3-4  Severe Pain = Pain Score of 7-10, CPOT 5-8          HYDROmorphone (DILAUDID) injection 0.5 mg  Dose: 0.5 mg  Freq: Every 2 Hours PRN Route: IV  PRN Reason: Severe Pain  Start: 03/31/23 1822 End: 04/07/23 1821   Admin Instructions:   Based on patient request - if  ordered for moderate or severe pain, provider allows for administration of a medication prescribed for a lower pain scale.  If given for pain, use the following pain scale:  Mild Pain = Pain Score of 1-3, CPOT 1-2  Moderate Pain = Pain Score of 4-6, CPOT 3-4  Severe Pain = Pain Score of 7-10, CPOT 5-8         And  naloxone (NARCAN) injection 0.4 mg  Dose: 0.4 mg  Freq: Every 5 Minutes PRN Route: IV  PRN Reason: Respiratory Depression  Start: 03/31/23 1822   Admin Instructions:   If Respiratory Rate Less Than 8 or Patient is Difficult to Arouse, Stop ALL Narcotics & Contact Provider.  Administer Slow IV Push.  Repeat As Ordered Until Respiratory Rate is Greater Than 12.         HYDROmorphone (DILAUDID) injection 0.5 mg  Dose: 0.5 mg  Freq: Every 5 Minutes PRN Route: IV  PRN Reason: Moderate Pain  Start: 03/31/23 1712 End: 03/31/23 1821   Admin Instructions:   Maximum total dose of hydromorphone is 2 mg.  If given for pain, use the following pain scale:  Mild Pain = Pain Score of 1-3, CPOT 1-2  Moderate Pain = Pain Score of 4-6, CPOT 3-4  Severe Pain = Pain Score of 7-10, CPOT 5-8         ipratropium-albuterol (DUO-NEB) nebulizer solution 3 mL  Dose: 3 mL  Freq: Once As Needed Route: NEBULIZATION  PRN Reasons: Wheezing,Shortness of Air  PRN Comment: bronchospasm  Start: 03/31/23 1712 End: 03/31/23 1821   Admin Instructions:   Notify Anesthesia if minineb is given         labetalol (NORMODYNE,TRANDATE) injection 5 mg  Dose: 5 mg  Freq: Every 5 Minutes PRN Route: IV  PRN Reason: High Blood Pressure  PRN Comment: for systolic blood pressure greater than 180 mmHg or diastolic blood pressure greater than 105 mmHg  Start: 03/31/23 1712 End: 03/31/23 1821   Admin Instructions:   Hold for heart rate less than 60.    If labetalol and hydralazine are both ordered, use labetalol first. Maximum dose of labetalol is 20mg IV while in PACU, notify anesthesiologist for further orders if needed.  Give IV Push over 2 minutes.          lidocaine PF 1% (XYLOCAINE) injection 0.5 mL  Dose: 0.5 mL  Freq: Once As Needed Route: IJ  PRN Comment: IV Start  Start: 03/31/23 1549 End: 03/31/23 1733         lidocaine PF 2% (XYLOCAINE) injection  Freq: As Needed Route: INFILTRATION  Start: 03/31/23 1625 End: 03/31/23 1720         midazolam (VERSED) injection 1 mg  Dose: 1 mg  Freq: Every 10 Minutes PRN Route: IV  PRN Comment: Anxiety prophylaxis, Pre-op comfort  Start: 03/31/23 1549 End: 03/31/23 1733   Admin Instructions:   May repeat dose in 10 minutes one time then contact provider for additional orders.           naloxone (NARCAN) injection 0.2 mg  Dose: 0.2 mg  Freq: As Needed Route: IV  PRN Reasons: Opioid Reversal,Respiratory Depression  PRN Comment: unresponsiveness, decrease oxygen saturation  Indications of Use: ACUTE RESPIRATORY FAILURE,OPIOID-INDUCED RESPIRATORY DEPRESSION  Start: 03/31/23 1712 End: 03/31/23 1821   Admin Instructions:   Notify Anesthesia if given         ondansetron (ZOFRAN) injection  Freq: As Needed Route: IV  Start: 03/31/23 1631 End: 03/31/23 1720         ondansetron (ZOFRAN) injection 4 mg  Dose: 4 mg  Freq: Every 6 Hours PRN Route: IV  PRN Reasons: Nausea,Vomiting  Start: 03/31/23 1822   Admin Instructions:   If BOTH ondansetron (ZOFRAN) and promethazine (PHENERGAN) are ordered use ondansetron first and THEN promethazine IF ondansetron is ineffective.     0440           ondansetron (ZOFRAN) injection 4 mg  Dose: 4 mg  Freq: Once As Needed Route: IV  PRN Reasons: Nausea,Vomiting  Indications of Use: POSTOPERATIVE NAUSEA AND VOMITING  Start: 03/31/23 1712 End: 03/31/23 1821   Admin Instructions:   If BOTH ondansetron (ZOFRAN) and promethazine (PHENERGAN) are ordered use ondansetron first and THEN promethazine IF ondansetron is ineffective.         oxyCODONE-acetaminophen (PERCOCET) 5-325 MG per tablet 1 tablet  Dose: 1 tablet  Freq: Every 4 Hours PRN Route: PO  PRN Reason: Moderate Pain  Start: 03/31/23 1822 End: 04/07/23  1821   Admin Instructions:   Based on patient request - if ordered for moderate or severe pain, provider allows for administration of a medication prescribed for a lower pain scale.  [RAHUL]    Do not exceed 4 grams of acetaminophen in a 24 hr period. Max dose of 2gm for AST/ALT greater than 120 units/L        If given for pain, use the following pain scale:   Mild Pain = Pain Score of 1-3, CPOT 1-2  Moderate Pain = Pain Score of 4-6, CPOT 3-4  Severe Pain = Pain Score of 7-10, CPOT 5-8    0809   1226   1726     2223        0440           oxyCODONE-acetaminophen (PERCOCET) 7.5-325 MG per tablet 1 tablet  Dose: 1 tablet  Freq: Every 4 Hours PRN Route: PO  PRN Reason: Severe Pain  Start: 03/31/23 1712 End: 03/31/23 1821   Admin Instructions:   [RAHUL]    Do not exceed 4 grams of acetaminophen in a 24 hr period. Max dose of 2gm for AST/ALT greater than 120 units/L        If given for pain, use the following pain scale:   Mild Pain = Pain Score of 1-3, CPOT 1-2  Moderate Pain = Pain Score of 4-6, CPOT 3-4  Severe Pain = Pain Score of 7-10, CPOT 5-8          promethazine (PHENERGAN) suppository 25 mg  Dose: 25 mg  Freq: Once As Needed Route: RE  PRN Reasons: Nausea,Vomiting  Start: 03/31/23 1712 End: 03/31/23 1821   Admin Instructions:   If BOTH ondansetron (ZOFRAN) and promethazine (PHENERGAN) are ordered use ondansetron first and THEN promethazine IF ondansetron is ineffective.         Or  promethazine (PHENERGAN) tablet 25 mg  Dose: 25 mg  Freq: Once As Needed Route: PO  PRN Reasons: Nausea,Vomiting  Start: 03/31/23 1712 End: 03/31/23 1821   Admin Instructions:   If BOTH ondansetron (ZOFRAN) and promethazine (PHENERGAN) are ordered use ondansetron first and THEN promethazine IF ondansetron is ineffective.           Propofol (DIPRIVAN) injection  Freq: As Needed Route: IV  Start: 03/31/23 1625 End: 03/31/23 1720         rocuronium (ZEMURON) injection  Freq: As Needed Route: IV  Start: 03/31/23 1625 End: 03/31/23 4003          sodium chloride (NS) irrigation solution  Freq: As Needed  Start: 03/31/23 1646 End: 03/31/23 1721         sodium chloride (NS) irrigation solution  Freq: As Needed  Start: 03/31/23 1646 End: 03/31/23 1721         sodium chloride 0.9 % flush 10 mL  Dose: 10 mL  Freq: As Needed Route: IV  PRN Reason: Line Care  Start: 03/31/23 1822          sodium chloride 0.9 % flush 10 mL  Dose: 10 mL  Freq: As Needed Route: IV  PRN Reason: Line Care  Start: 03/31/23 1123 End: 03/31/23 1822         sodium chloride 0.9 % flush 3-10 mL  Dose: 3-10 mL  Freq: As Needed Route: IV  PRN Reason: Line Care  Start: 03/31/23 1549 End: 03/31/23 1733         sodium chloride 0.9 % infusion 40 mL  Dose: 40 mL  Freq: As Needed Route: IV  PRN Reason: Line Care  Start: 03/31/23 1822   Admin Instructions:   Following administration of an IV intermittent medication, flush line with 40mL NS at 100mL/hr.         sugammadex (BRIDION) injection  Freq: As Needed Route: IV  Start: 03/31/23 1709 End: 03/31/23 1720         Medications 04/01/23 04/02/23 04/03/23

## 2023-04-03 NOTE — PLAN OF CARE
Goal Outcome Evaluation:  Plan of Care Reviewed With: patient        Progress: no change  Outcome Evaluation: temp max 100.2 today. pt flushed and feels SOA with exertion. O2 97-99%  on room air. lungs clear. enc IS use. in chair most of day. voiding without diff. had loose BM today. Juan David with mod amt cloudy, tan dng. lap x 2 cdi with glue. amb in torre with asst. only wants apap for pain, med x 2. reports minimal pain.

## 2023-04-03 NOTE — PROGRESS NOTES
Continued Stay Note  [facility]     Patient Name: Patria Norris  MRN: [id number]  Today's Date: 4/3/2023    Admit Date: 3/31/2023    Plan: Home no needs   Discharge Plan     Row Name 04/03/23 1014       Plan    Plan Home no needs    Plan Comments Met with patient who confirmed DC plan is to return home. Stated family/friends will assist as needed and will provide transportation at DC. Denies any needs/equipment.               Discharge Codes    No documentation.               Expected Discharge Date and Time     Expected Discharge Date Expected Discharge Time    Apr 4, 2023             Tia Tiwari RN

## 2023-04-03 NOTE — PLAN OF CARE
Goal Outcome Evaluation:              Outcome Evaluation: Elevated temp, HR tachy, other vitals stable, on room air. Lap sites CDI, RAUL drain x 1 with serosanguineous output, IV Zosyn given per order, ambulated in hallway, IS encouraged, voiding freely, 1 small BM, patient denies nausea, mensus present, kristopher. regular diet, scd's on, stand by assist.

## 2023-04-03 NOTE — PROGRESS NOTES
"General Surgery  Progress Note    CC: Follow-up perforated appendicitis    POD#3 laparoscopic appendectomy    S: She had a fever to 101.2 yesterday evening.  Her nausea has resolved and she has begun having bowel movements, with 3 episodes of diarrhea overnight.    O:/83 (BP Location: Right arm, Patient Position: Lying)   Pulse 104   Temp 98.8 °F (37.1 °C) (Oral)   Resp 16   Ht 162.6 cm (64\")   Wt 115 kg (254 lb)   LMP 02/28/2023 (Approximate)   SpO2 97%   BMI 43.60 kg/m²     Intake & Output:  UOP: 1750 ml/24 hrs  RAUL: 78 mL/24 hrs, serous  BM x3    GENERAL: alert, well appearing, and in no distress, appears flushed  HEENT: normocephalic, atraumatic, moist mucous membranes, clear sclerae   CHEST: clear to auscultation, no wheezes, rales or rhonchi, symmetric air entry  CARDIAC: regular rate and rhythm    ABDOMEN: Soft, nondistended, incisions clean/dry/intact with glue, RAUL output in left lower quadrant serous  EXTREMITIES: no cyanosis, clubbing, or edema   SKIN: Warm and moist, no rashes    LABS  Results from last 7 days   Lab Units 04/03/23  0514 04/02/23 0459 04/01/23  0630   WBC 10*3/mm3 14.39* 11.89* 16.72*   HEMOGLOBIN g/dL 11.8* 11.5* 12.8   HEMATOCRIT % 33.7* 32.9* 38.2   PLATELETS 10*3/mm3 309 258 303     Results from last 7 days   Lab Units 04/02/23 0459 04/01/23  0630 03/31/23  1143   SODIUM mmol/L 135* 137 137   POTASSIUM mmol/L 3.8 3.6 3.5   CHLORIDE mmol/L 100 99 101   CO2 mmol/L 26.5 24.9 25.3   BUN mg/dL 9 9 9   CREATININE mg/dL 0.96 1.04* 0.96   CALCIUM mg/dL 8.5* 8.7 9.2   BILIRUBIN mg/dL  --   --  1.4*   ALK PHOS U/L  --   --  71   ALT (SGPT) U/L  --   --  18   AST (SGOT) U/L  --   --  13   GLUCOSE mg/dL 144* 146* 149*             A/P: 42 y.o. female POD#3 laparoscopic appendectomy for perforated appendicitis    Continue same for now.  If she remains febrile between today and tomorrow or her white count goes up any further tomorrow I will check a CT abdomen/pelvis to assess for " intra-abdominal abscess.    Amarilis Franklin MD  General, Robotic, and Endoscopic Surgery  [facility]    [address], [address]  [place] [address]  P: [phone]  F: [phone]

## 2023-04-04 ENCOUNTER — APPOINTMENT (OUTPATIENT)
Dept: GENERAL RADIOLOGY | Facility: HOSPITAL | Age: 43
DRG: 338 | End: 2023-04-04
Payer: COMMERCIAL

## 2023-04-04 LAB
BASOPHILS # BLD AUTO: 0.06 10*3/MM3 (ref 0–0.2)
BASOPHILS NFR BLD AUTO: 0.4 % (ref 0–1.5)
DEPRECATED RDW RBC AUTO: 39.5 FL (ref 37–54)
EOSINOPHIL # BLD AUTO: 0.18 10*3/MM3 (ref 0–0.4)
EOSINOPHIL NFR BLD AUTO: 1.2 % (ref 0.3–6.2)
ERYTHROCYTE [DISTWIDTH] IN BLOOD BY AUTOMATED COUNT: 12.1 % (ref 12.3–15.4)
HCT VFR BLD AUTO: 32.6 % (ref 34–46.6)
HGB BLD-MCNC: 11.2 G/DL (ref 12–15.9)
IMM GRANULOCYTES # BLD AUTO: 0.28 10*3/MM3 (ref 0–0.05)
IMM GRANULOCYTES NFR BLD AUTO: 1.9 % (ref 0–0.5)
LYMPHOCYTES # BLD AUTO: 1.62 10*3/MM3 (ref 0.7–3.1)
LYMPHOCYTES NFR BLD AUTO: 11.1 % (ref 19.6–45.3)
MCH RBC QN AUTO: 30.3 PG (ref 26.6–33)
MCHC RBC AUTO-ENTMCNC: 34.4 G/DL (ref 31.5–35.7)
MCV RBC AUTO: 88.1 FL (ref 79–97)
MONOCYTES # BLD AUTO: 1.59 10*3/MM3 (ref 0.1–0.9)
MONOCYTES NFR BLD AUTO: 10.9 % (ref 5–12)
NEUTROPHILS NFR BLD AUTO: 10.83 10*3/MM3 (ref 1.7–7)
NEUTROPHILS NFR BLD AUTO: 74.5 % (ref 42.7–76)
NRBC BLD AUTO-RTO: 0 /100 WBC (ref 0–0.2)
PLATELET # BLD AUTO: 319 10*3/MM3 (ref 140–450)
PMV BLD AUTO: 9.8 FL (ref 6–12)
RBC # BLD AUTO: 3.7 10*6/MM3 (ref 3.77–5.28)
WBC NRBC COR # BLD: 14.56 10*3/MM3 (ref 3.4–10.8)

## 2023-04-04 PROCEDURE — 99024 POSTOP FOLLOW-UP VISIT: CPT | Performed by: SURGERY

## 2023-04-04 PROCEDURE — 71046 X-RAY EXAM CHEST 2 VIEWS: CPT

## 2023-04-04 PROCEDURE — 25010000002 PIPERACILLIN SOD-TAZOBACTAM PER 1 G: Performed by: SURGERY

## 2023-04-04 PROCEDURE — 85025 COMPLETE CBC W/AUTO DIFF WBC: CPT | Performed by: SURGERY

## 2023-04-04 RX ORDER — L.ACID,PARA/B.BIFIDUM/S.THERM 8B CELL
1 CAPSULE ORAL 2 TIMES DAILY
Status: DISCONTINUED | OUTPATIENT
Start: 2023-04-04 | End: 2023-04-07 | Stop reason: HOSPADM

## 2023-04-04 RX ADMIN — TAZOBACTAM SODIUM AND PIPERACILLIN SODIUM 3.38 G: 375; 3 INJECTION, SOLUTION INTRAVENOUS at 06:34

## 2023-04-04 RX ADMIN — TAZOBACTAM SODIUM AND PIPERACILLIN SODIUM 3.38 G: 375; 3 INJECTION, SOLUTION INTRAVENOUS at 16:59

## 2023-04-04 RX ADMIN — ACETAMINOPHEN 650 MG: 325 TABLET, FILM COATED ORAL at 18:33

## 2023-04-04 RX ADMIN — Medication 1 CAPSULE: at 20:41

## 2023-04-04 RX ADMIN — OXYCODONE AND ACETAMINOPHEN 1 TABLET: 5; 325 TABLET ORAL at 01:44

## 2023-04-04 RX ADMIN — OXYCODONE AND ACETAMINOPHEN 1 TABLET: 5; 325 TABLET ORAL at 23:24

## 2023-04-04 RX ADMIN — OXYCODONE AND ACETAMINOPHEN 1 TABLET: 5; 325 TABLET ORAL at 13:05

## 2023-04-04 RX ADMIN — TAZOBACTAM SODIUM AND PIPERACILLIN SODIUM 3.38 G: 375; 3 INJECTION, SOLUTION INTRAVENOUS at 23:24

## 2023-04-04 NOTE — PLAN OF CARE
Goal Outcome Evaluation:  Plan of Care Reviewed With: patient        Progress: no change  Outcome Evaluation: abdomen soft, lap sites intact with dermabond, RAUL x1 with small amt of yellow/cloudy drainage, pt with SOA and decreased breath sounds today, IS up to about 750, MD aware and cxr ordered, ambulated in torre and sat in chair today, showered, iv abx infusing as ordered, tolerating regular diet without n/v, passing flatus and has had bm's

## 2023-04-04 NOTE — PLAN OF CARE
Goal Outcome Evaluation:              Outcome Evaluation: Afebrile so far this shift. HR tachy, other vitals stable, on room air. pt flushed and cont. to feel pressure in abdomen and stabbing pain under R breast. Voiding freely, RAUL drain x 1 with cloudy tan/yellow output. lap x 2 CDI with glue, ambulated in hallway, Tylenol and Percocet given for pain, IV Zosyn given per order, IS use encouraged, scd's on, up with stand-by assist.

## 2023-04-04 NOTE — PROGRESS NOTES
"General Surgery  Progress Note    CC: Follow-up perforated appendicitis    POD#4 laparoscopic appendectomy    S: She has had no further fevers, with her maximal temperature of 100.2 °F yesterday evening.  She has developed some shortness of breath, and has struggled with deep breathing.  She denies any sputum production or cough.  She has also developed intermittent right upper quadrant abdominal pain.    O:/93 (BP Location: Right arm, Patient Position: Lying)   Pulse 94   Temp 98.9 °F (37.2 °C) (Oral)   Resp 22   Ht 162.6 cm (64\")   Wt 115 kg (254 lb)   LMP 02/28/2023 (Approximate)   SpO2 99%   BMI 43.60 kg/m²     Intake & Output:  UOP: 1900 ml/24 hrs  RAUL: 50 mL/24 hrs, serous  BM x1    GENERAL: alert, well appearing, and in no distress, appears flushed  HEENT: normocephalic, atraumatic, moist mucous membranes, clear sclerae   CHEST: clear to auscultation, no wheezes, rales or rhonchi, symmetric air entry  CARDIAC: regular rate and rhythm    ABDOMEN: Soft, nondistended, incisions clean/dry/intact with glue, RAUL output in left lower quadrant serous  EXTREMITIES: no cyanosis, clubbing, or edema   SKIN: Warm and moist, no rashes    LABS  Results from last 7 days   Lab Units 04/04/23  0451 04/03/23  0514 04/02/23  0459   WBC 10*3/mm3 14.56* 14.39* 11.89*   HEMOGLOBIN g/dL 11.2* 11.8* 11.5*   HEMATOCRIT % 32.6* 33.7* 32.9*   PLATELETS 10*3/mm3 319 309 258     Results from last 7 days   Lab Units 04/02/23  0459 04/01/23  0630 03/31/23  1143   SODIUM mmol/L 135* 137 137   POTASSIUM mmol/L 3.8 3.6 3.5   CHLORIDE mmol/L 100 99 101   CO2 mmol/L 26.5 24.9 25.3   BUN mg/dL 9 9 9   CREATININE mg/dL 0.96 1.04* 0.96   CALCIUM mg/dL 8.5* 8.7 9.2   BILIRUBIN mg/dL  --   --  1.4*   ALK PHOS U/L  --   --  71   ALT (SGPT) U/L  --   --  18   AST (SGOT) U/L  --   --  13   GLUCOSE mg/dL 144* 146* 149*             A/P: 42 y.o. female POD#4 laparoscopic appendectomy for perforated appendicitis    Check PA/Lat CXR to rule out " pneumonia.  I will recheck a CBC on her tomorrow, and if her white count remains elevated I will order a CT abdomen/pelvis to assess for intra-abdominal abscess.    Amarilis Franklin MD  General, Robotic, and Endoscopic Surgery  [facility]    [address], [address]  [place] [address]  P: [phone]  F: [phone]

## 2023-04-05 ENCOUNTER — APPOINTMENT (OUTPATIENT)
Dept: CT IMAGING | Facility: HOSPITAL | Age: 43
DRG: 338 | End: 2023-04-05
Payer: COMMERCIAL

## 2023-04-05 LAB
BACTERIA SPEC AEROBE CULT: NORMAL
BACTERIA SPEC AEROBE CULT: NORMAL
BASOPHILS # BLD MANUAL: 0.14 10*3/MM3 (ref 0–0.2)
BASOPHILS NFR BLD MANUAL: 1 % (ref 0–1.5)
DEPRECATED RDW RBC AUTO: 40.9 FL (ref 37–54)
EOSINOPHIL # BLD MANUAL: 0.55 10*3/MM3 (ref 0–0.4)
EOSINOPHIL NFR BLD MANUAL: 4 % (ref 0.3–6.2)
ERYTHROCYTE [DISTWIDTH] IN BLOOD BY AUTOMATED COUNT: 12.4 % (ref 12.3–15.4)
HCT VFR BLD AUTO: 33.1 % (ref 34–46.6)
HGB BLD-MCNC: 11.1 G/DL (ref 12–15.9)
LYMPHOCYTES # BLD MANUAL: 1.11 10*3/MM3 (ref 0.7–3.1)
LYMPHOCYTES NFR BLD MANUAL: 5 % (ref 5–12)
MCH RBC QN AUTO: 30.3 PG (ref 26.6–33)
MCHC RBC AUTO-ENTMCNC: 33.5 G/DL (ref 31.5–35.7)
MCV RBC AUTO: 90.4 FL (ref 79–97)
MONOCYTES # BLD: 0.69 10*3/MM3 (ref 0.1–0.9)
NEUTROPHILS # BLD AUTO: 11.35 10*3/MM3 (ref 1.7–7)
NEUTROPHILS NFR BLD MANUAL: 82 % (ref 42.7–76)
NRBC BLD AUTO-RTO: 0.1 /100 WBC (ref 0–0.2)
PLAT MORPH BLD: NORMAL
PLATELET # BLD AUTO: 335 10*3/MM3 (ref 140–450)
PMV BLD AUTO: 9.9 FL (ref 6–12)
POLYCHROMASIA BLD QL SMEAR: ABNORMAL
RBC # BLD AUTO: 3.66 10*6/MM3 (ref 3.77–5.28)
VARIANT LYMPHS NFR BLD MANUAL: 8 % (ref 19.6–45.3)
WBC MORPH BLD: NORMAL
WBC NRBC COR # BLD: 13.84 10*3/MM3 (ref 3.4–10.8)

## 2023-04-05 PROCEDURE — 85007 BL SMEAR W/DIFF WBC COUNT: CPT | Performed by: SURGERY

## 2023-04-05 PROCEDURE — 99024 POSTOP FOLLOW-UP VISIT: CPT | Performed by: SURGERY

## 2023-04-05 PROCEDURE — 25010000002 PIPERACILLIN SOD-TAZOBACTAM PER 1 G: Performed by: SURGERY

## 2023-04-05 PROCEDURE — 85025 COMPLETE CBC W/AUTO DIFF WBC: CPT | Performed by: SURGERY

## 2023-04-05 RX ADMIN — TAZOBACTAM SODIUM AND PIPERACILLIN SODIUM 3.38 G: 375; 3 INJECTION, SOLUTION INTRAVENOUS at 06:18

## 2023-04-05 RX ADMIN — Medication 1 CAPSULE: at 08:11

## 2023-04-05 RX ADMIN — OXYCODONE AND ACETAMINOPHEN 1 TABLET: 5; 325 TABLET ORAL at 22:19

## 2023-04-05 RX ADMIN — TAZOBACTAM SODIUM AND PIPERACILLIN SODIUM 3.38 G: 375; 3 INJECTION, SOLUTION INTRAVENOUS at 15:50

## 2023-04-05 RX ADMIN — Medication 10 ML: at 21:41

## 2023-04-05 RX ADMIN — Medication 1 CAPSULE: at 21:41

## 2023-04-05 RX ADMIN — TAZOBACTAM SODIUM AND PIPERACILLIN SODIUM 3.38 G: 375; 3 INJECTION, SOLUTION INTRAVENOUS at 22:52

## 2023-04-05 NOTE — PLAN OF CARE
Goal Outcome Evaluation:  Plan of Care Reviewed With: patient        Progress: improving  Outcome Evaluation: Lap sites CD&I, RAUL with small amount of serous drainage. C/o 4/10 pain, but denies need for any pain medication. IV antibiotics given. VSS, afebrile since 1700 last shift. Up ad rodo, in chair majority of today. CT chest and abd/pelvis ordered for today still waiting to be done.

## 2023-04-05 NOTE — PLAN OF CARE
Goal Outcome Evaluation:  Plan of Care Reviewed With: patient           Outcome Evaluation: VSS, Lap sites CDI, spprox with dermabond, RAUL x1 with milky tan drainage, walked unit, voiding freely, IV abx given, percocet x1 for pain, no ausea, tolerating reg diet, passing gas and having BM's. IS. cough and deep breathing encouraged, will continue to monitor.

## 2023-04-05 NOTE — PROGRESS NOTES
"General Surgery  Progress Note    CC: Follow-up perforated appendicitis    POD#5 laparoscopic appendectomy    S: She feels much better after showering yesterday.  Her RAUL drain output is serous.  She continues to have low-grade almost fevers to 100.2, 100.3, etc.  She is still experiencing a dull aching right upper quadrant pain that has been new since surgery.    O:/89 (BP Location: Right arm, Patient Position: Sitting)   Pulse 91   Temp 98.7 °F (37.1 °C) (Oral)   Resp 18   Ht 162.6 cm (64\")   Wt 115 kg (254 lb)   LMP 02/28/2023 (Approximate)   SpO2 96%   BMI 43.60 kg/m²     Intake & Output:  UOP: 1200 ml/24 hrs  RAUL: 35 mL/24 hrs, serous    GENERAL: alert, well appearing, and in no distress, appears flushed  HEENT: normocephalic, atraumatic, moist mucous membranes, clear sclerae   CHEST: clear to auscultation, no wheezes, rales or rhonchi, symmetric air entry  CARDIAC: regular rate and rhythm    ABDOMEN: Soft, nondistended, incisions clean/dry/intact with glue, RAUL output in left lower quadrant serous  EXTREMITIES: no cyanosis, clubbing, or edema   SKIN: Warm and moist, no rashes    LABS  Results from last 7 days   Lab Units 04/05/23  0504 04/04/23  0451 04/03/23  0514   WBC 10*3/mm3 13.84* 14.56* 14.39*   HEMOGLOBIN g/dL 11.1* 11.2* 11.8*   HEMATOCRIT % 33.1* 32.6* 33.7*   PLATELETS 10*3/mm3 335 319 309   MONOCYTES % % 5.0  --   --      Results from last 7 days   Lab Units 04/02/23  0459 04/01/23  0630 03/31/23  1143   SODIUM mmol/L 135* 137 137   POTASSIUM mmol/L 3.8 3.6 3.5   CHLORIDE mmol/L 100 99 101   CO2 mmol/L 26.5 24.9 25.3   BUN mg/dL 9 9 9   CREATININE mg/dL 0.96 1.04* 0.96   CALCIUM mg/dL 8.5* 8.7 9.2   BILIRUBIN mg/dL  --   --  1.4*   ALK PHOS U/L  --   --  71   ALT (SGPT) U/L  --   --  18   AST (SGOT) U/L  --   --  13   GLUCOSE mg/dL 144* 146* 149*             A/P: 42 y.o. female POD#5 laparoscopic appendectomy for perforated appendicitis    Her CXR yesterday showed a right pleural " effusion, but otherwise was unremarkable. I suspect the effusion is reactive to an intra-abdominal process in the RUQ such as a post-op abscess. I have ordered a CT chest/abd/pe;vis to assess for intra-abdominal abscess, empyema, etc.     Amarilis Franklin MD  General, Robotic, and Endoscopic Surgery  [facility]    [address], [address]  [place] [address]  P: [phone]  F: [phone]

## 2023-04-06 ENCOUNTER — APPOINTMENT (OUTPATIENT)
Dept: CT IMAGING | Facility: HOSPITAL | Age: 43
DRG: 338 | End: 2023-04-06
Payer: COMMERCIAL

## 2023-04-06 LAB
DEPRECATED RDW RBC AUTO: 42.1 FL (ref 37–54)
EOSINOPHIL # BLD MANUAL: 0.26 10*3/MM3 (ref 0–0.4)
EOSINOPHIL NFR BLD MANUAL: 2 % (ref 0.3–6.2)
ERYTHROCYTE [DISTWIDTH] IN BLOOD BY AUTOMATED COUNT: 12.4 % (ref 12.3–15.4)
HCT VFR BLD AUTO: 32.6 % (ref 34–46.6)
HGB BLD-MCNC: 10.7 G/DL (ref 12–15.9)
LYMPHOCYTES # BLD MANUAL: 1.31 10*3/MM3 (ref 0.7–3.1)
LYMPHOCYTES NFR BLD MANUAL: 6.1 % (ref 5–12)
MCH RBC QN AUTO: 30.1 PG (ref 26.6–33)
MCHC RBC AUTO-ENTMCNC: 32.8 G/DL (ref 31.5–35.7)
MCV RBC AUTO: 91.6 FL (ref 79–97)
METAMYELOCYTES NFR BLD MANUAL: 2 % (ref 0–0)
MONOCYTES # BLD: 0.79 10*3/MM3 (ref 0.1–0.9)
MYELOCYTES NFR BLD MANUAL: 1 % (ref 0–0)
NEUTROPHILS # BLD AUTO: 10.24 10*3/MM3 (ref 1.7–7)
NEUTROPHILS NFR BLD MANUAL: 78.8 % (ref 42.7–76)
NRBC BLD AUTO-RTO: 0 /100 WBC (ref 0–0.2)
PLAT MORPH BLD: NORMAL
PLATELET # BLD AUTO: 372 10*3/MM3 (ref 140–450)
PMV BLD AUTO: 9.6 FL (ref 6–12)
POLYCHROMASIA BLD QL SMEAR: ABNORMAL
RBC # BLD AUTO: 3.56 10*6/MM3 (ref 3.77–5.28)
VARIANT LYMPHS NFR BLD MANUAL: 10.1 % (ref 19.6–45.3)
WBC MORPH BLD: NORMAL
WBC NRBC COR # BLD: 13 10*3/MM3 (ref 3.4–10.8)

## 2023-04-06 PROCEDURE — 85007 BL SMEAR W/DIFF WBC COUNT: CPT | Performed by: SURGERY

## 2023-04-06 PROCEDURE — 74177 CT ABD & PELVIS W/CONTRAST: CPT

## 2023-04-06 PROCEDURE — 25010000002 PIPERACILLIN SOD-TAZOBACTAM PER 1 G: Performed by: SURGERY

## 2023-04-06 PROCEDURE — 99024 POSTOP FOLLOW-UP VISIT: CPT | Performed by: SURGERY

## 2023-04-06 PROCEDURE — 71260 CT THORAX DX C+: CPT

## 2023-04-06 PROCEDURE — 85025 COMPLETE CBC W/AUTO DIFF WBC: CPT | Performed by: SURGERY

## 2023-04-06 PROCEDURE — 25510000001 IOPAMIDOL 61 % SOLUTION: Performed by: SURGERY

## 2023-04-06 RX ADMIN — TAZOBACTAM SODIUM AND PIPERACILLIN SODIUM 3.38 G: 375; 3 INJECTION, SOLUTION INTRAVENOUS at 18:28

## 2023-04-06 RX ADMIN — Medication 1 CAPSULE: at 10:03

## 2023-04-06 RX ADMIN — Medication 1 CAPSULE: at 21:47

## 2023-04-06 RX ADMIN — IOPAMIDOL 85 ML: 612 INJECTION, SOLUTION INTRAVENOUS at 08:38

## 2023-04-06 RX ADMIN — TAZOBACTAM SODIUM AND PIPERACILLIN SODIUM 3.38 G: 375; 3 INJECTION, SOLUTION INTRAVENOUS at 10:47

## 2023-04-06 RX ADMIN — OXYCODONE AND ACETAMINOPHEN 1 TABLET: 5; 325 TABLET ORAL at 22:54

## 2023-04-06 RX ADMIN — Medication 10 ML: at 10:47

## 2023-04-06 NOTE — PLAN OF CARE
Goal Outcome Evaluation:  Plan of Care Reviewed With: patient        Progress: improving  Outcome Evaluation: pt reports feeling better today, she showered this AM, RAUL x1 w serous output, voiding, CT scans completed today, tolerating diet, walked in torre, up ad rodo, denies need for pain medication, possible d/c tomorrow

## 2023-04-06 NOTE — PLAN OF CARE
Goal Outcome Evaluation:  Plan of Care Reviewed With: patient           Outcome Evaluation: VSS, walked unit, no nausea, PO pain med given for pain x1, voiding freely, IVF at KVO, Patient decline to have CT done at 2300 and CT will move it to later today. ssleeping between care, minimal output from RAUL drain. will continue to monitor.

## 2023-04-06 NOTE — PROGRESS NOTES
"General Surgery  Progress Note    CC: Follow-up perforated appendicitis    POD#6 laparoscopic appendectomy    S: She has had no further fevers in over 24 hours and her white blood cell count is slowly trending downward.  She feels great with no nausea, vomiting, diarrhea, or constipation.    O:/83 (BP Location: Right arm, Patient Position: Sitting)   Pulse 76   Temp 97.4 °F (36.3 °C) (Oral)   Resp 18   Ht 162.6 cm (64\")   Wt 115 kg (254 lb)   LMP 02/28/2023 (Approximate)   SpO2 97%   BMI 43.60 kg/m²     Intake & Output:  UOP: 600 ml/24 hrs  RAUL: 30 mL/24 hrs, serous    GENERAL: alert, well appearing, and in no distress, appears flushed  HEENT: normocephalic, atraumatic, moist mucous membranes, clear sclerae   CHEST: clear to auscultation, no wheezes, rales or rhonchi, symmetric air entry  CARDIAC: regular rate and rhythm    ABDOMEN: Soft, nondistended, incisions clean/dry/intact with glue, RAUL output in left lower quadrant serous  EXTREMITIES: no cyanosis, clubbing, or edema   SKIN: Warm and moist, no rashes    LABS  Results from last 7 days   Lab Units 04/06/23  0452 04/05/23  0504 04/04/23  0451   WBC 10*3/mm3 13.00* 13.84* 14.56*   HEMOGLOBIN g/dL 10.7* 11.1* 11.2*   HEMATOCRIT % 32.6* 33.1* 32.6*   PLATELETS 10*3/mm3 372 335 319   MONOCYTES % % 6.1 5.0  --      Results from last 7 days   Lab Units 04/02/23  0459 04/01/23  0630 03/31/23  1143   SODIUM mmol/L 135* 137 137   POTASSIUM mmol/L 3.8 3.6 3.5   CHLORIDE mmol/L 100 99 101   CO2 mmol/L 26.5 24.9 25.3   BUN mg/dL 9 9 9   CREATININE mg/dL 0.96 1.04* 0.96   CALCIUM mg/dL 8.5* 8.7 9.2   BILIRUBIN mg/dL  --   --  1.4*   ALK PHOS U/L  --   --  71   ALT (SGPT) U/L  --   --  18   AST (SGOT) U/L  --   --  13   GLUCOSE mg/dL 144* 146* 149*             A/P: 42 y.o. female POD#6 laparoscopic appendectomy for perforated appendicitis    I reviewed her CT scans done this morning which demonstrates a small 3 cm abscess in the right paracolic gutter, too small " to be percutaneously drained.  This will be treated with IV antibiotics, with eventual transition to oral antibiotics and repeat CT scan in about 2 weeks as an outpatient.  I think she will be ready for discharge to home as early as tomorrow.  I also discussed the other incidentally found abnormalities on her CT which include the gallstones and a left breast calcified mass that she reports was biopsied recently and came back benign.  I will make sure as an outpatient that we repeat mammography to be thorough.    Amarilis Franklin MD  General, Robotic, and Endoscopic Surgery  [facility]    [address], [address]  [place] [address]  P: [phone]  F: [phone]

## 2023-04-07 ENCOUNTER — READMISSION MANAGEMENT (OUTPATIENT)
Dept: CALL CENTER | Facility: HOSPITAL | Age: 43
End: 2023-04-07
Payer: COMMERCIAL

## 2023-04-07 VITALS
TEMPERATURE: 98 F | OXYGEN SATURATION: 99 % | HEART RATE: 78 BPM | BODY MASS INDEX: 43.36 KG/M2 | WEIGHT: 254 LBS | HEIGHT: 64 IN | SYSTOLIC BLOOD PRESSURE: 140 MMHG | RESPIRATION RATE: 16 BRPM | DIASTOLIC BLOOD PRESSURE: 96 MMHG

## 2023-04-07 PROBLEM — K65.1 POSTOPERATIVE INTRA-ABDOMINAL ABSCESS: Status: ACTIVE | Noted: 2023-04-07

## 2023-04-07 PROBLEM — T81.43XA POSTOPERATIVE INTRA-ABDOMINAL ABSCESS: Status: ACTIVE | Noted: 2023-04-07

## 2023-04-07 PROBLEM — K35.30 ACUTE APPENDICITIS WITH LOCALIZED PERITONITIS WITHOUT GANGRENE, UNSPECIFIED WHETHER ABSCESS PRESENT, UNSPECIFIED WHETHER PERFORATION PRESENT: Status: RESOLVED | Noted: 2023-04-01 | Resolved: 2023-04-07

## 2023-04-07 LAB
BASOPHILS # BLD AUTO: 0.07 10*3/MM3 (ref 0–0.2)
BASOPHILS NFR BLD AUTO: 0.6 % (ref 0–1.5)
DEPRECATED RDW RBC AUTO: 41.8 FL (ref 37–54)
EOSINOPHIL # BLD AUTO: 0.33 10*3/MM3 (ref 0–0.4)
EOSINOPHIL NFR BLD AUTO: 2.8 % (ref 0.3–6.2)
ERYTHROCYTE [DISTWIDTH] IN BLOOD BY AUTOMATED COUNT: 12.5 % (ref 12.3–15.4)
HCT VFR BLD AUTO: 31.1 % (ref 34–46.6)
HGB BLD-MCNC: 10.2 G/DL (ref 12–15.9)
IMM GRANULOCYTES # BLD AUTO: 0.56 10*3/MM3 (ref 0–0.05)
IMM GRANULOCYTES NFR BLD AUTO: 4.8 % (ref 0–0.5)
LYMPHOCYTES # BLD AUTO: 2.03 10*3/MM3 (ref 0.7–3.1)
LYMPHOCYTES NFR BLD AUTO: 17.2 % (ref 19.6–45.3)
MCH RBC QN AUTO: 30.1 PG (ref 26.6–33)
MCHC RBC AUTO-ENTMCNC: 32.8 G/DL (ref 31.5–35.7)
MCV RBC AUTO: 91.7 FL (ref 79–97)
MONOCYTES # BLD AUTO: 0.96 10*3/MM3 (ref 0.1–0.9)
MONOCYTES NFR BLD AUTO: 8.2 % (ref 5–12)
NEUTROPHILS NFR BLD AUTO: 66.4 % (ref 42.7–76)
NEUTROPHILS NFR BLD AUTO: 7.82 10*3/MM3 (ref 1.7–7)
NRBC BLD AUTO-RTO: 0 /100 WBC (ref 0–0.2)
PLATELET # BLD AUTO: 457 10*3/MM3 (ref 140–450)
PMV BLD AUTO: 9.6 FL (ref 6–12)
RBC # BLD AUTO: 3.39 10*6/MM3 (ref 3.77–5.28)
WBC NRBC COR # BLD: 11.77 10*3/MM3 (ref 3.4–10.8)

## 2023-04-07 PROCEDURE — 99024 POSTOP FOLLOW-UP VISIT: CPT | Performed by: SURGERY

## 2023-04-07 PROCEDURE — 85025 COMPLETE CBC W/AUTO DIFF WBC: CPT | Performed by: SURGERY

## 2023-04-07 PROCEDURE — 25010000002 PIPERACILLIN SOD-TAZOBACTAM PER 1 G: Performed by: SURGERY

## 2023-04-07 RX ORDER — OXYCODONE HYDROCHLORIDE AND ACETAMINOPHEN 5; 325 MG/1; MG/1
1 TABLET ORAL EVERY 4 HOURS PRN
Qty: 10 TABLET | Refills: 0 | Status: SHIPPED | OUTPATIENT
Start: 2023-04-07

## 2023-04-07 RX ORDER — AMOXICILLIN AND CLAVULANATE POTASSIUM 875; 125 MG/1; MG/1
1 TABLET, FILM COATED ORAL 2 TIMES DAILY
Qty: 14 TABLET | Refills: 0 | Status: SHIPPED | OUTPATIENT
Start: 2023-04-07 | End: 2023-04-14

## 2023-04-07 RX ADMIN — Medication 1 CAPSULE: at 09:05

## 2023-04-07 RX ADMIN — TAZOBACTAM SODIUM AND PIPERACILLIN SODIUM 3.38 G: 375; 3 INJECTION, SOLUTION INTRAVENOUS at 02:28

## 2023-04-07 NOTE — PLAN OF CARE
Goal Outcome Evaluation:  Plan of Care Reviewed With: patient        Progress: improving  Outcome Evaluation: iv @ kvo for abx, vdg, med x1 for pain, independant, kenneth x1, no temps, i/s, prob d/c friday

## 2023-04-07 NOTE — PROGRESS NOTES
Case Management Discharge Note      Final Note: Discharged home. Tia Tiwari, RAMON              Transportation Services  Private: Car    Final Discharge Disposition Code: 01 - home or self-care

## 2023-04-07 NOTE — PROGRESS NOTES
Continued Stay Note  [facility]     Patient Name: Patria Norris  MRN: [id number]  Today's Date: 4/7/2023    Admit Date: 3/31/2023    Plan: Home no needs   Discharge Plan     Row Name 04/07/23 1256       Plan    Plan Home no needs    Plan Comments Discharge order noted. Met with patient who confirmed DC plan is to return home. Denies needs/equipment.               Discharge Codes    No documentation.               Expected Discharge Date and Time     Expected Discharge Date Expected Discharge Time    Apr 7, 2023             Tia Tiwari RN

## 2023-04-07 NOTE — DISCHARGE SUMMARY
Discharge Summary    Patient name: Patria Norris    Medical record number: [id number]    Admission date: 3/31/2023  Discharge date: 4/7/2023     Attending physician: Amarilis Franklin MD    Primary care physician: Provider, No Known    Consulting physician(s): None    Condition on discharge: improving    Admitting diagnosis: Acute appendicitis    Final diagnosis:   Acute appendicitis with perforation but without abscess or gangrene  Postoperative intra-abdominal abscess    Procedures: Laparoscopic appendectomy    History of present illness: The patient is a  42 y.o. female that was admitted to the hospital with acute onset abdominal pain and CT findings suggesting acute appendicitis with possible perforation.  She was admitted for further management.    Hospital course: She was evaluated emergency room, admitted to [facility] for observation, and brought directly to the operating room after receiving a dose of empiric antibiotics.  She underwent laparoscopic appendectomy where the appendix was found to be perforated with feculent peritonitis that was washed out.  A RAUL drain was left in place and she was monitored on [facility] for 6 days postop.  She had consistent fevers for the first 3 to 4 days postop as well as a leukocytosis.  Follow-up CT scan performed on postoperative day #5 showed a small intra-abdominal abscess too small for percutaneous drainage.  Her fevers and leukocytosis improved dramatically thereafter and she is being discharged home on oral Augmentin for another 7 days of treatment.  Her RAUL drain was removed prior to her discharge home.  She will have a follow-up CT scan in 2 weeks to reassess the intra-abdominal abscess.  Also incidentally discovered was a left breast mass, which she says has been biopsied and returned benign.  I will make sure to check a follow-up outpatient mammogram if she is not up-to-date.    Discharge medications:      Discharge Medications      New Medications       Instructions Start Date   amoxicillin-clavulanate 875-125 MG per tablet  Commonly known as: Augmentin   1 tablet, Oral, 2 Times Daily      oxyCODONE-acetaminophen 5-325 MG per tablet  Commonly known as: PERCOCET   1 tablet, Oral, Every 4 Hours PRN             Discharge instructions:    - Resume regular diet as tolerated.  - No specific activity restrictions post-op. You may resume all activities as tolerated once your pain level allows.  - No driving while taking narcotic pain medications.  - You may resume showering, no tub bathing for two weeks while your incisions heal.  - Wash your incisions with soap and water daily in the shower, pat dry, and leave open to air.    Follow-up appointment: Follow up with Amarilis Franklin MD in the office in 2 weeks. Call for appointment at [phone].    CODE STATUS: Full code

## 2023-04-07 NOTE — PAYOR COMM NOTE
"Maog (42 y.o. Female)         DC SUMMARY FOR [id number]      CONTACT VANESSA GOMES  F# [phone]          Date of Birth   1980    Social Security Number       Address   [facility] [place] [address]    Home Phone   [phone]    MRN   [id number]       Latter-day   Rastafari    Marital Status   Single                            Admission Date   3/31/23    Admission Type   Emergency    Admitting Provider   Amarilis Franklin MD    Attending Provider       Department, Room/Bed   [address], P677/1       Discharge Date   4/7/2023    Discharge Disposition   Home or Self Care    Discharge Destination                               Attending Provider: (none)   Allergies: No Known Allergies    Isolation: None   Infection: None   Code Status: CPR    Ht: 162.6 cm (64\")   Wt: 115 kg (254 lb)    Admission Cmt: None   Principal Problem: Acute appendicitis with perforation, localized peritonitis, and gangrene, without abscess [K35.32]                 Active Insurance as of 3/31/2023     Primary Coverage     Payor Plan Insurance Group Employer/Plan Group    [employer]HE[employer] BLUE CROSS AN[employer]M BLUE CROSS BLUE SHIELD PPO [id number]     Payor Plan Address Payor Plan Phone Number Payor Plan Fax Number Effective Dates    [address] [phone]  1/1/2021 - None Entered    [facility] [id number]       Subscriber Name Subscriber Birth Date Member ID       MAGO NORRIS 1980 [id number]                 Emergency Contacts      (Rel.) Home Phone Work Phone Mobile Phone    Marva Norris (Mother) -- -- [phone]               Operative/Procedure Notes (last 7 days)      Amarilis Franklin MD at 03/31/23 1641        Operative Note :  MD Mago Vicente  1980    Procedure Date: 03/31/23    Pre-op Diagnosis:  Acute appendicitis with localized peritonitis without gangrene, unspecified whether abscess present, unspecified whether perforation present " [K35.30]    Post-Operative Diagnosis:  Acute appendicitis with localized peritonitis with gangrene, perforation, without abscess    Procedure:   Laparoscopic appendectomy    Surgeon: Amarilis Franklin MD    Assistant: None    Anesthesia:  General (general endotracheal tube)    Estimated Blood Loss: minimal    Specimens: Appendix    Complications: None    Indications:  The patient is a 42 year-old lady who came to the emergency room this afternoon with acute onset right lower quadrant abdominal pain and CT findings suggestive of acute appendicitis with fairly intense periappendiceal fat stranding and inflammation concerning for perforation.  She understands the risks of laparoscopic appendectomy which I have proposed we perform this evening.  These risks include but are not limited to bleeding, wound infection, intra-abdominal abscess development, and damage to any other intra-abdominal structures such as the small bowel, colon, right ureter, etc.  Despite these risks, she has consented to proceed.    Findings: Feculent fluid in the right lower quadrant around inflamed appendix with distal perforation    Description of procedure:  The patient was brought to the operating room and placed on the OR table in supine position.  An endotracheal tube was inserted and general anesthesia induced.  A Austin catheter was inserted into the urinary bladder using sterile technique.  Her left arm was tucked at her side and the abdominal wall prepped and draped in a sterile fashion.  A surgical timeout was completed.  A supraumbilical skin incision was made after instilling 0.5% Marcaine with epinephrine.  The umbilical stalk was grasped and elevated and a longitudinal fasciotomy made.  A Nicolas trocar was inserted and secured with 0 Vicryl stay sutures.  The abdomen was insufflated and under direct visualization I placed 2 additional 5 mm trocars in the suprapubic and left lower quadrant spaces.  The right lower quadrant was  inspected and a small amount of feculent appearing fluid was pooling posterior to the cecum.  The appendix was located in a retrocecal location with the base of the appendix looking grossly normal but shortly beyond the base the appendix had evidence of early gangrene and full-thickness inflammation.  I was able to eventually identify the tip of the appendix along the posterior wall of the ascending colon.  This was grasped and elevated and blunt dissection was performed to separate the appendix off of the sidewall of the cecum.  I divided the appendiceal mesentery with the harmonic scalpel until all that remained was the gangrenous appearing appendix suspended off the cecum at the confluence of the tenia.  The appendix was transected there using a white load on the laparoscopic stapler adjacent to Treves ligament and the appendix passed off to pathology after removal from the peritoneal cavity in an Endo Catch bag.  The right lower quadrant was reinspected and copious warm normal saline was irrigated and suctioned dry.  A 19 Sami RAUL drain was positioned through the left lower quadrant trocar site coursing into the pelvis and then up into the right lower quadrant adjacent to the staple line along the cecum.  The drain was secured to the skin with a 2-0 silk drain stitch.  All trocars were then removed and the abdomen desufflated.  The fascia at the umbilical trocar site was closed with 0 Vicryl figure-of-eight suture and all skin incisions closed with 4-0 Vicryl subcuticular suture and topical Exofin glue.  She was extubated, Austin catheter removed, and she transferred to PACU in stable condition with all counts correct per nursing.    Amarilis Franklin MD  General, Robotic, and Endoscopic Surgery  [facility]    [address] [place] [address], [address]  [place] [address]  P: [phone]  F: [phone]       Electronically signed by Amarilis Franklin MD at 03/31/23 9596          Physician Progress Notes  "(last 72 hours)      Amarilis Franklin MD at 04/06/23 1254          General Surgery  Progress Note    CC: Follow-up perforated appendicitis    POD#6 laparoscopic appendectomy    S: She has had no further fevers in over 24 hours and her white blood cell count is slowly trending downward.  She feels great with no nausea, vomiting, diarrhea, or constipation.    O:/83 (BP Location: Right arm, Patient Position: Sitting)   Pulse 76   Temp 97.4 °F (36.3 °C) (Oral)   Resp 18   Ht 162.6 cm (64\")   Wt 115 kg (254 lb)   LMP 02/28/2023 (Approximate)   SpO2 97%   BMI 43.60 kg/m²     Intake & Output:  UOP: 600 ml/24 hrs  RAUL: 30 mL/24 hrs, serous    GENERAL: alert, well appearing, and in no distress, appears flushed  HEENT: normocephalic, atraumatic, moist mucous membranes, clear sclerae   CHEST: clear to auscultation, no wheezes, rales or rhonchi, symmetric air entry  CARDIAC: regular rate and rhythm    ABDOMEN: Soft, nondistended, incisions clean/dry/intact with glue, RAUL output in left lower quadrant serous  EXTREMITIES: no cyanosis, clubbing, or edema   SKIN: Warm and moist, no rashes    LABS  Results from last 7 days   Lab Units 04/06/23  0452 04/05/23  0504 04/04/23  0451   WBC 10*3/mm3 13.00* 13.84* 14.56*   HEMOGLOBIN g/dL 10.7* 11.1* 11.2*   HEMATOCRIT % 32.6* 33.1* 32.6*   PLATELETS 10*3/mm3 372 335 319   MONOCYTES % % 6.1 5.0  --      Results from last 7 days   Lab Units 04/02/23  0459 04/01/23  0630 03/31/23  1143   SODIUM mmol/L 135* 137 137   POTASSIUM mmol/L 3.8 3.6 3.5   CHLORIDE mmol/L 100 99 101   CO2 mmol/L 26.5 24.9 25.3   BUN mg/dL 9 9 9   CREATININE mg/dL 0.96 1.04* 0.96   CALCIUM mg/dL 8.5* 8.7 9.2   BILIRUBIN mg/dL  --   --  1.4*   ALK PHOS U/L  --   --  71   ALT (SGPT) U/L  --   --  18   AST (SGOT) U/L  --   --  13   GLUCOSE mg/dL 144* 146* 149*             A/P: 42 y.o. female POD#6 laparoscopic appendectomy for perforated appendicitis    I reviewed her CT scans done this morning which " "demonstrates a small 3 cm abscess in the right paracolic gutter, too small to be percutaneously drained.  This will be treated with IV antibiotics, with eventual transition to oral antibiotics and repeat CT scan in about 2 weeks as an outpatient.  I think she will be ready for discharge to home as early as tomorrow.  I also discussed the other incidentally found abnormalities on her CT which include the gallstones and a left breast calcified mass that she reports was biopsied recently and came back benign.  I will make sure as an outpatient that we repeat mammography to be thorough.    Amarilis Franklin MD  General, Robotic, and Endoscopic Surgery  [facility]    [address], [address]  [place] [address]  P: [phone]  F: [phone]       Electronically signed by Amarilis Franklin MD at 04/06/23 1256     Amarilis Franklin MD at 04/05/23 1203          General Surgery  Progress Note    CC: Follow-up perforated appendicitis    POD#5 laparoscopic appendectomy    S: She feels much better after showering yesterday.  Her RAUL drain output is serous.  She continues to have low-grade almost fevers to 100.2, 100.3, etc.  She is still experiencing a dull aching right upper quadrant pain that has been new since surgery.    O:/89 (BP Location: Right arm, Patient Position: Sitting)   Pulse 91   Temp 98.7 °F (37.1 °C) (Oral)   Resp 18   Ht 162.6 cm (64\")   Wt 115 kg (254 lb)   LMP 02/28/2023 (Approximate)   SpO2 96%   BMI 43.60 kg/m²     Intake & Output:  UOP: 1200 ml/24 hrs  RAUL: 35 mL/24 hrs, serous    GENERAL: alert, well appearing, and in no distress, appears flushed  HEENT: normocephalic, atraumatic, moist mucous membranes, clear sclerae   CHEST: clear to auscultation, no wheezes, rales or rhonchi, symmetric air entry  CARDIAC: regular rate and rhythm    ABDOMEN: Soft, nondistended, incisions clean/dry/intact with glue, RAUL output in left lower quadrant serous  EXTREMITIES: no " cyanosis, clubbing, or edema   SKIN: Warm and moist, no rashes    LABS  Results from last 7 days   Lab Units 04/05/23  0504 04/04/23  0451 04/03/23  0514   WBC 10*3/mm3 13.84* 14.56* 14.39*   HEMOGLOBIN g/dL 11.1* 11.2* 11.8*   HEMATOCRIT % 33.1* 32.6* 33.7*   PLATELETS 10*3/mm3 335 319 309   MONOCYTES % % 5.0  --   --      Results from last 7 days   Lab Units 04/02/23  0459 04/01/23  0630 03/31/23  1143   SODIUM mmol/L 135* 137 137   POTASSIUM mmol/L 3.8 3.6 3.5   CHLORIDE mmol/L 100 99 101   CO2 mmol/L 26.5 24.9 25.3   BUN mg/dL 9 9 9   CREATININE mg/dL 0.96 1.04* 0.96   CALCIUM mg/dL 8.5* 8.7 9.2   BILIRUBIN mg/dL  --   --  1.4*   ALK PHOS U/L  --   --  71   ALT (SGPT) U/L  --   --  18   AST (SGOT) U/L  --   --  13   GLUCOSE mg/dL 144* 146* 149*             A/P: 42 y.o. female POD#5 laparoscopic appendectomy for perforated appendicitis    Her CXR yesterday showed a right pleural effusion, but otherwise was unremarkable. I suspect the effusion is reactive to an intra-abdominal process in the RUQ such as a post-op abscess. I have ordered a CT chest/abd/pe;vis to assess for intra-abdominal abscess, empyema, etc.     Amarilis Franklin MD  General, Robotic, and Endoscopic Surgery  [facility]    [facility], [address]  [place] [address]  P: [phone]  F: [phone]       Electronically signed by Amarilis Franklin MD at 04/05/23 1205       Consult Notes (last 72 hours)  Notes from 04/04/23 1347 through 04/07/23 1347   No notes of this type exist for this encounter.            Discharge Summary      Amarilis Franklin MD at 04/07/23 1229          Discharge Summary    Patient name: Patria Norris    Medical record number: [id number]    Admission date: 3/31/2023  Discharge date: 4/7/2023     Attending physician: Amarilis Franklin MD    Primary care physician: Provider, No Known    Consulting physician(s): None    Condition on discharge: improving    Admitting diagnosis: Acute  appendicitis    Final diagnosis:   Acute appendicitis with perforation but without abscess or gangrene  Postoperative intra-abdominal abscess    Procedures: Laparoscopic appendectomy    History of present illness: The patient is a  42 y.o. female that was admitted to the hospital with acute onset abdominal pain and CT findings suggesting acute appendicitis with possible perforation.  She was admitted for further management.    Hospital course: She was evaluated emergency room, admitted to [facility] for observation, and brought directly to the operating room after receiving a dose of empiric antibiotics.  She underwent laparoscopic appendectomy where the appendix was found to be perforated with feculent peritonitis that was washed out.  A RAUL drain was left in place and she was monitored on [facility] for 6 days postop.  She had consistent fevers for the first 3 to 4 days postop as well as a leukocytosis.  Follow-up CT scan performed on postoperative day #5 showed a small intra-abdominal abscess too small for percutaneous drainage.  Her fevers and leukocytosis improved dramatically thereafter and she is being discharged home on oral Augmentin for another 7 days of treatment.  Her RAUL drain was removed prior to her discharge home.  She will have a follow-up CT scan in 2 weeks to reassess the intra-abdominal abscess.  Also incidentally discovered was a left breast mass, which she says has been biopsied and returned benign.  I will make sure to check a follow-up outpatient mammogram if she is not up-to-date.    Discharge medications:      Discharge Medications      New Medications      Instructions Start Date   amoxicillin-clavulanate 875-125 MG per tablet  Commonly known as: Augmentin   1 tablet, Oral, 2 Times Daily      oxyCODONE-acetaminophen 5-325 MG per tablet  Commonly known as: PERCOCET   1 tablet, Oral, Every 4 Hours PRN             Discharge instructions:    - Resume regular diet as tolerated.  - No  specific activity restrictions post-op. You may resume all activities as tolerated once your pain level allows.  - No driving while taking narcotic pain medications.  - You may resume showering, no tub bathing for two weeks while your incisions heal.  - Wash your incisions with soap and water daily in the shower, pat dry, and leave open to air.    Follow-up appointment: Follow up with Amarilis Franklin MD in the office in 2 weeks. Call for appointment at [phone].    CODE STATUS: Full code      Electronically signed by Amarilis Franklin MD at 04/07/23 1717

## 2023-04-08 NOTE — OUTREACH NOTE
Prep Survey    Flowsheet Row Responses   [facility] facility patient discharged from? [place]   Is LACE score < 7 ? No   Eligibility Readm Mgmt   Discharge diagnosis Lap appendectomy   Does the patient have one of the following disease processes/diagnoses(primary or secondary)? General Surgery   Does the patient have Home health ordered? No   Is there a DME ordered? No   Prep survey completed? Yes          LIANET LAI - Registered Nurse

## 2023-04-10 NOTE — PAYOR COMM NOTE
"Mago Norris (42 y.o. Female)           CLINICAL FOR [id number]  THIS IS SHOWING ONLY 1 DAY APPROVED RE-FAXING CLINICAL FOR REVIEW.     CONTACT VANESSA GOMES  P# [phone]  F# [phone]          Date of Birth   1980    Social Security Number       Address   [facility] [place] [address]    Home Phone   [phone]    MRN   [id number]       Jehovah's witness   [employer]    Marital Status   Single                            Admission Date   3/31/23    Admission Type   Emergency    Admitting Provider   Amarilis Franklin MD    Attending Provider       Department, Room/Bed   [facility] [address], P677/1       Discharge Date   4/7/2023    Discharge Disposition   Home or Self Care    Discharge Destination                               Attending Provider: (none)   Allergies: No Known Allergies    Isolation: None   Infection: None   Code Status: Prior    Ht: 162.6 cm (64\")   Wt: 115 kg (254 lb)    Admission Cmt: None   Principal Problem: Acute appendicitis with perforation, localized peritonitis, and gangrene, without abscess [K35.32]                 Active Insurance as of 3/31/2023     Primary Coverage     Payor Plan Insurance Group Employer/Plan Group    [employer]HEM BLUE CROSS AN[employer]M BLUE CROSS BLUE SHIELD PPO [id number]     Payor Plan Address Payor Plan Phone Number Payor Plan Fax Number Effective Dates    [address] [phone]  1/1/2021 - None Entered    [place] [address]       Subscriber Name Subscriber Birth Date Member ID       MAGO NORRIS 1980 [id number]                 Emergency Contacts      (Rel.) Home Phone Work Phone Mobile Phone    Marva Norris (Mother) -- -- [phone]               Physician Progress Notes (all)      Amarilis Franklin MD at 04/06/23 1254          General Surgery  Progress Note    CC: Follow-up perforated appendicitis    POD#6 laparoscopic appendectomy    S: She has had no further fevers in over 24 hours and her white blood cell count is " "slowly trending downward.  She feels great with no nausea, vomiting, diarrhea, or constipation.    O:/83 (BP Location: Right arm, Patient Position: Sitting)   Pulse 76   Temp 97.4 °F (36.3 °C) (Oral)   Resp 18   Ht 162.6 cm (64\")   Wt 115 kg (254 lb)   LMP 02/28/2023 (Approximate)   SpO2 97%   BMI 43.60 kg/m²     Intake & Output:  UOP: 600 ml/24 hrs  RAUL: 30 mL/24 hrs, serous    GENERAL: alert, well appearing, and in no distress, appears flushed  HEENT: normocephalic, atraumatic, moist mucous membranes, clear sclerae   CHEST: clear to auscultation, no wheezes, rales or rhonchi, symmetric air entry  CARDIAC: regular rate and rhythm    ABDOMEN: Soft, nondistended, incisions clean/dry/intact with glue, RAUL output in left lower quadrant serous  EXTREMITIES: no cyanosis, clubbing, or edema   SKIN: Warm and moist, no rashes    LABS  Results from last 7 days   Lab Units 04/06/23  0452 04/05/23  0504 04/04/23  0451   WBC 10*3/mm3 13.00* 13.84* 14.56*   HEMOGLOBIN g/dL 10.7* 11.1* 11.2*   HEMATOCRIT % 32.6* 33.1* 32.6*   PLATELETS 10*3/mm3 372 335 319   MONOCYTES % % 6.1 5.0  --      Results from last 7 days   Lab Units 04/02/23  0459 04/01/23  0630 03/31/23  1143   SODIUM mmol/L 135* 137 137   POTASSIUM mmol/L 3.8 3.6 3.5   CHLORIDE mmol/L 100 99 101   CO2 mmol/L 26.5 24.9 25.3   BUN mg/dL 9 9 9   CREATININE mg/dL 0.96 1.04* 0.96   CALCIUM mg/dL 8.5* 8.7 9.2   BILIRUBIN mg/dL  --   --  1.4*   ALK PHOS U/L  --   --  71   ALT (SGPT) U/L  --   --  18   AST (SGOT) U/L  --   --  13   GLUCOSE mg/dL 144* 146* 149*             A/P: 42 y.o. female POD#6 laparoscopic appendectomy for perforated appendicitis    I reviewed her CT scans done this morning which demonstrates a small 3 cm abscess in the right paracolic gutter, too small to be percutaneously drained.  This will be treated with IV antibiotics, with eventual transition to oral antibiotics and repeat CT scan in about 2 weeks as an outpatient.  I think she will be " "ready for discharge to home as early as tomorrow.  I also discussed the other incidentally found abnormalities on her CT which include the gallstones and a left breast calcified mass that she reports was biopsied recently and came back benign.  I will make sure as an outpatient that we repeat mammography to be thorough.    Amarilis Franklin MD  General, Robotic, and Endoscopic Surgery  [facility]    [address], [address]  [place] [address]  P: [phone]  F: [phone]       Electronically signed by Amarilis Franklin MD at 04/06/23 1256     Amarilis Franklin MD at 04/05/23 1203          General Surgery  Progress Note    CC: Follow-up perforated appendicitis    POD#5 laparoscopic appendectomy    S: She feels much better after showering yesterday.  Her RAUL drain output is serous.  She continues to have low-grade almost fevers to 100.2, 100.3, etc.  She is still experiencing a dull aching right upper quadrant pain that has been new since surgery.    O:/89 (BP Location: Right arm, Patient Position: Sitting)   Pulse 91   Temp 98.7 °F (37.1 °C) (Oral)   Resp 18   Ht 162.6 cm (64\")   Wt 115 kg (254 lb)   LMP 02/28/2023 (Approximate)   SpO2 96%   BMI 43.60 kg/m²     Intake & Output:  UOP: 1200 ml/24 hrs  RAUL: 35 mL/24 hrs, serous    GENERAL: alert, well appearing, and in no distress, appears flushed  HEENT: normocephalic, atraumatic, moist mucous membranes, clear sclerae   CHEST: clear to auscultation, no wheezes, rales or rhonchi, symmetric air entry  CARDIAC: regular rate and rhythm    ABDOMEN: Soft, nondistended, incisions clean/dry/intact with glue, RAUL output in left lower quadrant serous  EXTREMITIES: no cyanosis, clubbing, or edema   SKIN: Warm and moist, no rashes    LABS  Results from last 7 days   Lab Units 04/05/23  0504 04/04/23  0451 04/03/23  0514   WBC 10*3/mm3 13.84* 14.56* 14.39*   HEMOGLOBIN g/dL 11.1* 11.2* 11.8*   HEMATOCRIT % 33.1* 32.6* 33.7*   PLATELETS " "10*3/mm3 335 319 309   MONOCYTES % % 5.0  --   --      Results from last 7 days   Lab Units 04/02/23  0459 04/01/23  0630 03/31/23  1143   SODIUM mmol/L 135* 137 137   POTASSIUM mmol/L 3.8 3.6 3.5   CHLORIDE mmol/L 100 99 101   CO2 mmol/L 26.5 24.9 25.3   BUN mg/dL 9 9 9   CREATININE mg/dL 0.96 1.04* 0.96   CALCIUM mg/dL 8.5* 8.7 9.2   BILIRUBIN mg/dL  --   --  1.4*   ALK PHOS U/L  --   --  71   ALT (SGPT) U/L  --   --  18   AST (SGOT) U/L  --   --  13   GLUCOSE mg/dL 144* 146* 149*             A/P: 42 y.o. female POD#5 laparoscopic appendectomy for perforated appendicitis    Her CXR yesterday showed a right pleural effusion, but otherwise was unremarkable. I suspect the effusion is reactive to an intra-abdominal process in the RUQ such as a post-op abscess. I have ordered a CT chest/abd/pe;vis to assess for intra-abdominal abscess, empyema, etc.     Amarilis Franklin MD  General, Robotic, and Endoscopic Surgery  [facility]    [address], [address]  [place] [address]  P: [phone]  F: [phone]       Electronically signed by Amarilis Franklin MD at 04/05/23 1205     Amarilis Franklin MD at 04/04/23 1251          General Surgery  Progress Note    CC: Follow-up perforated appendicitis    POD#4 laparoscopic appendectomy    S: She has had no further fevers, with her maximal temperature of 100.2 °F yesterday evening.  She has developed some shortness of breath, and has struggled with deep breathing.  She denies any sputum production or cough.  She has also developed intermittent right upper quadrant abdominal pain.    O:/93 (BP Location: Right arm, Patient Position: Lying)   Pulse 94   Temp 98.9 °F (37.2 °C) (Oral)   Resp 22   Ht 162.6 cm (64\")   Wt 115 kg (254 lb)   LMP 02/28/2023 (Approximate)   SpO2 99%   BMI 43.60 kg/m²     Intake & Output:  UOP: 1900 ml/24 hrs  RAUL: 50 mL/24 hrs, serous  BM x1    GENERAL: alert, well appearing, and in no distress, appears " flushed  HEENT: normocephalic, atraumatic, moist mucous membranes, clear sclerae   CHEST: clear to auscultation, no wheezes, rales or rhonchi, symmetric air entry  CARDIAC: regular rate and rhythm    ABDOMEN: Soft, nondistended, incisions clean/dry/intact with glue, RAUL output in left lower quadrant serous  EXTREMITIES: no cyanosis, clubbing, or edema   SKIN: Warm and moist, no rashes    LABS  Results from last 7 days   Lab Units 04/04/23  0451 04/03/23  0514 04/02/23  0459   WBC 10*3/mm3 14.56* 14.39* 11.89*   HEMOGLOBIN g/dL 11.2* 11.8* 11.5*   HEMATOCRIT % 32.6* 33.7* 32.9*   PLATELETS 10*3/mm3 319 309 258     Results from last 7 days   Lab Units 04/02/23  0459 04/01/23  0630 03/31/23  1143   SODIUM mmol/L 135* 137 137   POTASSIUM mmol/L 3.8 3.6 3.5   CHLORIDE mmol/L 100 99 101   CO2 mmol/L 26.5 24.9 25.3   BUN mg/dL 9 9 9   CREATININE mg/dL 0.96 1.04* 0.96   CALCIUM mg/dL 8.5* 8.7 9.2   BILIRUBIN mg/dL  --   --  1.4*   ALK PHOS U/L  --   --  71   ALT (SGPT) U/L  --   --  18   AST (SGOT) U/L  --   --  13   GLUCOSE mg/dL 144* 146* 149*             A/P: 42 y.o. female POD#4 laparoscopic appendectomy for perforated appendicitis    Check PA/Lat CXR to rule out pneumonia.  I will recheck a CBC on her tomorrow, and if her white count remains elevated I will order a CT abdomen/pelvis to assess for intra-abdominal abscess.    Amarilis Franklin MD  General, Robotic, and Endoscopic Surgery  [facility]    [address], [address]  [place] [address]  P: [phone]  F: [phone]       Electronically signed by Amarilis Franklin MD at 04/04/23 7408     Amarilis Franklin MD at 04/03/23 4127          General Surgery  Progress Note    CC: Follow-up perforated appendicitis    POD#3 laparoscopic appendectomy    S: She had a fever to 101.2 yesterday evening.  Her nausea has resolved and she has begun having bowel movements, with 3 episodes of diarrhea overnight.    O:/83 (BP Location: Right arm,  "Patient Position: Lying)   Pulse 104   Temp 98.8 °F (37.1 °C) (Oral)   Resp 16   Ht 162.6 cm (64\")   Wt 115 kg (254 lb)   LMP 02/28/2023 (Approximate)   SpO2 97%   BMI 43.60 kg/m²     Intake & Output:  UOP: 1750 ml/24 hrs  RAUL: 78 mL/24 hrs, serous  BM x3    GENERAL: alert, well appearing, and in no distress, appears flushed  HEENT: normocephalic, atraumatic, moist mucous membranes, clear sclerae   CHEST: clear to auscultation, no wheezes, rales or rhonchi, symmetric air entry  CARDIAC: regular rate and rhythm    ABDOMEN: Soft, nondistended, incisions clean/dry/intact with glue, RAUL output in left lower quadrant serous  EXTREMITIES: no cyanosis, clubbing, or edema   SKIN: Warm and moist, no rashes    LABS  Results from last 7 days   Lab Units 04/03/23  0514 04/02/23 0459 04/01/23  0630   WBC 10*3/mm3 14.39* 11.89* 16.72*   HEMOGLOBIN g/dL 11.8* 11.5* 12.8   HEMATOCRIT % 33.7* 32.9* 38.2   PLATELETS 10*3/mm3 309 258 303     Results from last 7 days   Lab Units 04/02/23 0459 04/01/23  0630 03/31/23  1143   SODIUM mmol/L 135* 137 137   POTASSIUM mmol/L 3.8 3.6 3.5   CHLORIDE mmol/L 100 99 101   CO2 mmol/L 26.5 24.9 25.3   BUN mg/dL 9 9 9   CREATININE mg/dL 0.96 1.04* 0.96   CALCIUM mg/dL 8.5* 8.7 9.2   BILIRUBIN mg/dL  --   --  1.4*   ALK PHOS U/L  --   --  71   ALT (SGPT) U/L  --   --  18   AST (SGOT) U/L  --   --  13   GLUCOSE mg/dL 144* 146* 149*             A/P: 42 y.o. female POD#3 laparoscopic appendectomy for perforated appendicitis    Continue same for now.  If she remains febrile between today and tomorrow or her white count goes up any further tomorrow I will check a CT abdomen/pelvis to assess for intra-abdominal abscess.    Amarilis Franklin MD  General, Robotic, and Endoscopic Surgery  [facility]    [address], [address]  [place] [address]  P: [phone]  F: [phone]       Electronically signed by Amarilis Franklin MD at 04/03/23 1000     Amarilis Franklin MD at " "04/02/23 1005          General Surgery  Progress Note    CC: Follow-up perforated appendicitis    POD#2 laparoscopic appendectomy    S: Her fevers have resolved.  She has developed mild nausea without vomiting.  Her pain is well controlled.    O:/78 (BP Location: Left arm, Patient Position: Lying)   Pulse 102   Temp 98.5 °F (36.9 °C) (Oral)   Resp 16   Ht 162.6 cm (64\")   Wt 115 kg (254 lb)   LMP 02/28/2023 (Approximate)   SpO2 95%   BMI 43.60 kg/m²     Intake & Output:  UOP: 1150 ml/24 hrs  RAUL: 90 mL/24 hrs, serosanguineous    GENERAL: alert, well appearing, and in no distress, appears flushed  HEENT: normocephalic, atraumatic, moist mucous membranes, clear sclerae   CHEST: clear to auscultation, no wheezes, rales or rhonchi, symmetric air entry  CARDIAC: regular rate and rhythm    ABDOMEN: Soft, nondistended, incisions clean/dry/intact with glue, RAUL output in left lower quadrant serosanguineous  EXTREMITIES: no cyanosis, clubbing, or edema   SKIN: Warm and moist, no rashes    LABS  Results from last 7 days   Lab Units 04/02/23 0459 04/01/23 0630 03/31/23  1143   WBC 10*3/mm3 11.89* 16.72* 22.00*   HEMOGLOBIN g/dL 11.5* 12.8 14.1   HEMATOCRIT % 32.9* 38.2 42.2   PLATELETS 10*3/mm3 258 303 337     Results from last 7 days   Lab Units 04/02/23 0459 04/01/23  0630 03/31/23  1143   SODIUM mmol/L 135* 137 137   POTASSIUM mmol/L 3.8 3.6 3.5   CHLORIDE mmol/L 100 99 101   CO2 mmol/L 26.5 24.9 25.3   BUN mg/dL 9 9 9   CREATININE mg/dL 0.96 1.04* 0.96   CALCIUM mg/dL 8.5* 8.7 9.2   BILIRUBIN mg/dL  --   --  1.4*   ALK PHOS U/L  --   --  71   ALT (SGPT) U/L  --   --  18   AST (SGOT) U/L  --   --  13   GLUCOSE mg/dL 144* 146* 149*             A/P: 42 y.o. female POD#2 laparoscopic appendectomy for perforated appendicitis    Continue antibiotics and recheck CBC in the morning.  Her nausea is concerning for possible early ileus development and I encouraged her to keep walking as much as she is able.  If she " "feels better by tomorrow and her white blood cell count has normalized, she can likely be discharged home tomorrow afternoon.    Amarilis Franklin MD  General, Robotic, and Endoscopic Surgery  [facility]    [address], [address]  [place] [address]  P: [phone]  F: [phone]       Electronically signed by Amarilis Franklin MD at 04/02/23 1007     Amarilis Franklin MD at 04/01/23 0834          General Surgery  Progress Note    CC: Follow-up perforated appendicitis    POD#1 laparoscopic appendectomy    S: She has been febrile up to 101.1 °F.  Her white blood cell count is somewhat better today at 16,000 from 22,000 yesterday.  She has been voiding well and denies any nausea or vomiting.    O:/73 (BP Location: Left arm, Patient Position: Lying)   Pulse 68   Temp 99 °F (37.2 °C) (Oral)   Resp 16   Ht 162.6 cm (64\")   Wt 115 kg (254 lb)   LMP 02/28/2023 (Approximate)   SpO2 97%   BMI 43.60 kg/m²     Intake & Output:  UOP: 150 mL + 1 void/24 hrs  RAUL: 155 mL/24 hrs, serosanguineous    GENERAL: alert, well appearing, and in no distress, appears flushed  HEENT: normocephalic, atraumatic, moist mucous membranes, clear sclerae   CHEST: clear to auscultation, no wheezes, rales or rhonchi, symmetric air entry  CARDIAC: regular rate and rhythm    ABDOMEN: Soft, nondistended, incisions clean/dry/intact with glue, RAUL output in left lower quadrant mostly serosanguineous with some purulence mixed in  EXTREMITIES: no cyanosis, clubbing, or edema   SKIN: Warm and moist, no rashes    LABS  Results from last 7 days   Lab Units 04/01/23  0630 03/31/23  1143   WBC 10*3/mm3 16.72* 22.00*   HEMOGLOBIN g/dL 12.8 14.1   HEMATOCRIT % 38.2 42.2   PLATELETS 10*3/mm3 303 337     Results from last 7 days   Lab Units 04/01/23  0630 03/31/23  1143   SODIUM mmol/L 137 137   POTASSIUM mmol/L 3.6 3.5   CHLORIDE mmol/L 99 101   CO2 mmol/L 24.9 25.3   BUN mg/dL 9 9   CREATININE mg/dL 1.04* 0.96   CALCIUM " mg/dL 8.7 9.2   BILIRUBIN mg/dL  --  1.4*   ALK PHOS U/L  --  71   ALT (SGPT) U/L  --  18   AST (SGOT) U/L  --  13   GLUCOSE mg/dL 146* 149*             A/P: 42 y.o. female POD#1 laparoscopic appendectomy for perforated appendicitis    Continue Unasyn and RAUL drain.  Recheck CBC tomorrow morning.  She won't be ready for discharge home until her fevers and leukocytosis have resolved.    Amarilis Franklin MD  General, Robotic, and Endoscopic Surgery  [facility]    [address], [address]  [place] [address]  P: [phone]  F: [phone]       Electronically signed by Amarilis Franklin MD at 04/01/23 0840       Consult Notes (all)    No notes of this type exist for this encounter.

## 2023-04-11 ENCOUNTER — TELEPHONE (OUTPATIENT)
Dept: SURGERY | Facility: CLINIC | Age: 43
End: 2023-04-11
Payer: COMMERCIAL

## 2023-04-11 ENCOUNTER — READMISSION MANAGEMENT (OUTPATIENT)
Dept: CALL CENTER | Facility: HOSPITAL | Age: 43
End: 2023-04-11
Payer: COMMERCIAL

## 2023-04-11 NOTE — OUTREACH NOTE
General Surgery Week 1 Survey    Flowsheet Row Responses   [facility] patient discharged from? [facility]   Does the patient have one of the following disease processes/diagnoses(primary or secondary)? General Surgery   Week 1 attempt successful? Yes   Call start time 1435   Call end time 1438   Discharge diagnosis Lap appendectomy   Meds reviewed with patient/caregiver? Yes   Is the patient having any side effects they believe may be caused by any medication additions or changes? No   Does the patient have all medications related to this admission filled (includes all antibiotics, pain medications, etc.) Yes   Is the patient taking all medications as directed (includes completed medication regime)? Yes   Does the patient have a follow up appointment scheduled with their surgeon? Yes   Has the patient kept scheduled appointments due by today? N/A   Comments CT scan on 4/21/23   Psychosocial issues? No   Did the patient receive a copy of their discharge instructions? Yes   Nursing interventions Reviewed instructions with patient   What is the patient's perception of their health status since discharge? Improving   Is the patient /caregiver able to teach back basic post-op care? Lifting as instructed by MD in discharge instructions   Is the patient/caregiver able to teach back signs and symptoms of incisional infection? Fever   If the patient is a current smoker, are they able to teach back resources for cessation? Not a smoker   Is the patient/caregiver able to teach back the hierarchy of who to call/visit for symptoms/problems? PCP, Specialist, Home health nurse, Urgent Care, ED, 911 Yes   Week 1 call completed? Yes   Revoked No further contact(revokes)-requires comment   Is the patient interested in additional calls from an ambulatory ?  NOTE:  applies to high risk patients requiring additional follow-up. No   Graduated/Revoked comments Pt did not want further calls          Sudha ACOSTA - Registered  Nurse

## 2023-04-11 NOTE — TELEPHONE ENCOUNTER
SPOKE TO PT REGARDING CT ABD/PELVIS SCHEDULED ON 4/28 @ 1:30 PM, ARRIVAL 1:15 PM FOR [facility]. INSTRUCTED PT TO HAVE LIQUIDS ONLY 4 HRS PRIOR TO THE CT. PT IS ALSO SCHEDULED TO SEE DR HOUSTON ON 5/2 @ 9:30 AM FOR HER POST-OP APPT.

## 2023-04-28 ENCOUNTER — HOSPITAL ENCOUNTER (OUTPATIENT)
Dept: CT IMAGING | Facility: HOSPITAL | Age: 43
Discharge: HOME | End: 2023-04-28
Payer: COMMERCIAL

## 2023-04-28 DIAGNOSIS — T81.43XA POSTOPERATIVE INTRA-ABDOMINAL ABSCESS: ICD-10-CM

## 2023-04-28 PROCEDURE — 74177 CT ABD & PELVIS W/CONTRAST: CPT

## 2023-04-28 PROCEDURE — 25510000001 IOPAMIDOL 61 % SOLUTION: Performed by: SURGERY

## 2023-04-28 RX ADMIN — IOPAMIDOL 100 ML: 612 INJECTION, SOLUTION INTRAVENOUS at 14:53

## 2023-05-02 ENCOUNTER — OFFICE VISIT (OUTPATIENT)
Dept: SURGERY | Facility: CLINIC | Age: 43
End: 2023-05-02
Payer: COMMERCIAL

## 2023-05-02 DIAGNOSIS — K35.32 ACUTE APPENDICITIS WITH PERFORATION, LOCALIZED PERITONITIS, AND GANGRENE, WITHOUT ABSCESS: ICD-10-CM

## 2023-05-02 DIAGNOSIS — Z09 SURGICAL FOLLOWUP: Primary | ICD-10-CM

## 2023-05-02 PROCEDURE — 99024 POSTOP FOLLOW-UP VISIT: CPT | Performed by: SURGERY

## 2023-05-08 ENCOUNTER — APPOINTMENT (OUTPATIENT)
Dept: WOMENS IMAGING | Facility: HOSPITAL | Age: 43
End: 2023-05-08
Payer: COMMERCIAL

## 2023-05-08 PROCEDURE — 77067 SCR MAMMO BI INCL CAD: CPT | Performed by: RADIOLOGY

## 2023-05-08 PROCEDURE — 77063 BREAST TOMOSYNTHESIS BI: CPT | Performed by: RADIOLOGY

## 2023-05-22 NOTE — PROGRESS NOTES
CHIEF COMPLAINT:   Chief Complaint   Patient presents with   • Post-op     Laparoscopic appendectomy 3/31/23       HISTORY OF PRESENT ILLNESS:  This is a 42 y.o. female who presents for a post-operative visit after undergoing laparoscopic appendectomy on 3/31/2023.  She stayed in the hospital for almost 1 week postop due to an ileus and sporadic fevers with postoperative CT scan demonstrating a small intra-abdominal abscess.  She was eventually discharged home on oral Augmentin which she completed.  She has had no further fevers or chills and follow-up CT done a few days ago showed resolution of the right lower quadrant abscess.  While in the hospital she also had significant shortness of breath and an incidentally discovered left breast mass.  She reports this left breast mass has previously been found on screening mammography and underwent biopsy but came back benign.  She is scheduled to have a repeat mammogram this upcoming Monday with [facility].  Her appetite is improving as is her stamina.    Pathology:   Appendix, Appendectomy:    A. Cystically dilated acute necrotizing appendicitis    PHYSICAL EXAM:  Lungs: Clear  Heart: RRR  ABD: Incisions are healing well without any erythema or signs of infection.  Ext: no significant edema, calves nontender    A/P:  This is a 42 y.o. female patient who is S/P laparoscopic appendectomy for acute necrotizing appendicitis on 3/31/2023    I reviewed and showed her the CT scan which shows resolution of the right lower quadrant abscess.  She is healing beautifully.  I discussed the benign pathology findings with her.  She can follow-up with me on an as-needed basis and has no activity or dietary restrictions moving forward.  I advised her to call me should her follow-up mammogram this upcoming Monday show any abnormalities as I would be happy to arrange for an ultrasound or mammographic guided biopsy.    Amarilis Franklin MD  General, Robotic, and Endoscopic  Surgery  [facility]    [address], [address]  [place] [id number]  P: [phone]  F: [phone]

## 2023-06-09 ENCOUNTER — OFFICE VISIT (OUTPATIENT)
Dept: FAMILY MEDICINE CLINIC | Facility: CLINIC | Age: 43
End: 2023-06-09
Payer: COMMERCIAL

## 2023-06-09 VITALS
BODY MASS INDEX: 43.29 KG/M2 | TEMPERATURE: 96.5 F | SYSTOLIC BLOOD PRESSURE: 128 MMHG | WEIGHT: 253.6 LBS | HEART RATE: 70 BPM | DIASTOLIC BLOOD PRESSURE: 84 MMHG | OXYGEN SATURATION: 98 % | RESPIRATION RATE: 16 BRPM | HEIGHT: 64 IN

## 2023-06-09 DIAGNOSIS — J90 PLEURAL EFFUSION ON RIGHT: Primary | ICD-10-CM

## 2023-06-09 DIAGNOSIS — Z00.00 LABORATORY EXAM ORDERED AS PART OF ROUTINE GENERAL MEDICAL EXAMINATION: ICD-10-CM

## 2023-06-09 NOTE — PROGRESS NOTES
Subjective   Patria Norris is a 42 y.o. female.     History of Present Illness   Patria Norris 42 y.o. female who presents today for a new patient appointment.    she has a history of   Patient Active Problem List   Diagnosis    Acute appendicitis with perforation, localized peritonitis, and gangrene, without abscess    Postoperative intra-abdominal abscess   .  she is here to establish care I reviewed the PFSH recorded today by my MA/LPN staff.   she   She has been feeling well.    Preventative counseling provided and diet/exercise and vaccinations.     She was seen at [facility] ER in April for appendicitis and subsequent appendectomy.  She had right pleural effusion noted.  Needs this rechecked. She reports feeling well and denies shortness of breath.      She has not had PCP in years.    The following portions of the patient's history were reviewed and updated as appropriate: allergies, current medications, past family history, past medical history, past social history, past surgical history, and problem list.    Review of Systems   Constitutional:  Negative for unexpected weight change.   Respiratory:  Negative for shortness of breath.    Cardiovascular:  Negative for chest pain and palpitations.   Psychiatric/Behavioral:  Negative for behavioral problems.      Objective   Physical Exam  Vitals and nursing note reviewed.   Constitutional:       Appearance: She is well-developed.   Neck:      Vascular: No carotid bruit.   Cardiovascular:      Rate and Rhythm: Normal rate and regular rhythm.   Pulmonary:      Effort: Pulmonary effort is normal.      Breath sounds: Normal breath sounds.   Neurological:      Mental Status: She is alert and oriented to person, place, and time.   Psychiatric:         Mood and Affect: Mood normal.         Behavior: Behavior normal.         Thought Content: Thought content normal.         Judgment: Judgment normal.   2 view chest xray ordered and reviewed by me.  Near resolution of right  pleural effusion compared to xray from 4/4/23.     Assessment & Plan   Diagnoses and all orders for this visit:    1. Pleural effusion on right (Primary)  -     XR Chest PA & Lateral (In Office)    2. Laboratory exam ordered as part of routine general medical examination  -     Comprehensive metabolic panel  -     Lipid panel  -     CBC and Differential  -     TSH  -     Vitamin D,25-Hydroxy  -     Vitamin B12

## 2023-06-14 ENCOUNTER — PATIENT ROUNDING (BHMG ONLY) (OUTPATIENT)
Dept: FAMILY MEDICINE CLINIC | Facility: CLINIC | Age: 43
End: 2023-06-14
Payer: COMMERCIAL

## 2023-06-14 NOTE — PROGRESS NOTES
A [employer] (TTR) message was sent to the patient for PATIENT ROUNDING with [facility].

## 2023-06-17 LAB
25(OH)D3+25(OH)D2 SERPL-MCNC: 22.7 NG/ML (ref 30–100)
ALBUMIN SERPL-MCNC: 4.4 G/DL (ref 3.5–5.2)
ALBUMIN/GLOB SERPL: 1.8 G/DL
ALP SERPL-CCNC: 56 U/L (ref 39–117)
ALT SERPL-CCNC: 8 U/L (ref 1–33)
AST SERPL-CCNC: 11 U/L (ref 1–32)
BASOPHILS # BLD AUTO: 0.07 10*3/MM3 (ref 0–0.2)
BASOPHILS NFR BLD AUTO: 1.2 % (ref 0–1.5)
BILIRUB SERPL-MCNC: 0.4 MG/DL (ref 0–1.2)
BUN SERPL-MCNC: 12 MG/DL (ref 6–20)
BUN/CREAT SERPL: 13.6 (ref 7–25)
CALCIUM SERPL-MCNC: 9.3 MG/DL (ref 8.6–10.5)
CHLORIDE SERPL-SCNC: 108 MMOL/L (ref 98–107)
CHOLEST SERPL-MCNC: 180 MG/DL (ref 0–200)
CO2 SERPL-SCNC: 21.8 MMOL/L (ref 22–29)
CREAT SERPL-MCNC: 0.88 MG/DL (ref 0.57–1)
EGFRCR SERPLBLD CKD-EPI 2021: 83.7 ML/MIN/1.73
EOSINOPHIL # BLD AUTO: 0.18 10*3/MM3 (ref 0–0.4)
EOSINOPHIL NFR BLD AUTO: 3 % (ref 0.3–6.2)
ERYTHROCYTE [DISTWIDTH] IN BLOOD BY AUTOMATED COUNT: 13.3 % (ref 12.3–15.4)
GLOBULIN SER CALC-MCNC: 2.5 GM/DL
GLUCOSE SERPL-MCNC: 88 MG/DL (ref 65–99)
HCT VFR BLD AUTO: 39.5 % (ref 34–46.6)
HDLC SERPL-MCNC: 48 MG/DL (ref 40–60)
HGB BLD-MCNC: 13.4 G/DL (ref 12–15.9)
IMM GRANULOCYTES # BLD AUTO: 0.03 10*3/MM3 (ref 0–0.05)
IMM GRANULOCYTES NFR BLD AUTO: 0.5 % (ref 0–0.5)
LDLC SERPL CALC-MCNC: 111 MG/DL (ref 0–100)
LYMPHOCYTES # BLD AUTO: 2.18 10*3/MM3 (ref 0.7–3.1)
LYMPHOCYTES NFR BLD AUTO: 36.5 % (ref 19.6–45.3)
MCH RBC QN AUTO: 30 PG (ref 26.6–33)
MCHC RBC AUTO-ENTMCNC: 33.9 G/DL (ref 31.5–35.7)
MCV RBC AUTO: 88.4 FL (ref 79–97)
MONOCYTES # BLD AUTO: 0.52 10*3/MM3 (ref 0.1–0.9)
MONOCYTES NFR BLD AUTO: 8.7 % (ref 5–12)
NEUTROPHILS # BLD AUTO: 3 10*3/MM3 (ref 1.7–7)
NEUTROPHILS NFR BLD AUTO: 50.1 % (ref 42.7–76)
NRBC BLD AUTO-RTO: 0 /100 WBC (ref 0–0.2)
PLATELET # BLD AUTO: 362 10*3/MM3 (ref 140–450)
POTASSIUM SERPL-SCNC: 4.7 MMOL/L (ref 3.5–5.2)
PROT SERPL-MCNC: 6.9 G/DL (ref 6–8.5)
RBC # BLD AUTO: 4.47 10*6/MM3 (ref 3.77–5.28)
SODIUM SERPL-SCNC: 142 MMOL/L (ref 136–145)
TRIGL SERPL-MCNC: 119 MG/DL (ref 0–150)
TSH SERPL DL<=0.005 MIU/L-ACNC: 20.3 UIU/ML (ref 0.27–4.2)
VIT B12 SERPL-MCNC: 341 PG/ML (ref 211–946)
VLDLC SERPL CALC-MCNC: 21 MG/DL (ref 5–40)
WBC # BLD AUTO: 5.98 10*3/MM3 (ref 3.4–10.8)

## 2023-06-22 PROBLEM — E03.9 ACQUIRED HYPOTHYROIDISM: Status: ACTIVE | Noted: 2023-06-22

## 2023-06-22 LAB
T3FREE SERPL-MCNC: 2.8 PG/ML (ref 2–4.4)
T4 FREE SERPL-MCNC: 0.85 NG/DL (ref 0.93–1.7)
WRITTEN AUTHORIZATION: NORMAL

## 2023-08-21 DIAGNOSIS — E03.9 ACQUIRED HYPOTHYROIDISM: ICD-10-CM

## 2023-08-21 RX ORDER — LEVOTHYROXINE SODIUM 0.05 MG/1
50 TABLET ORAL
Qty: 90 TABLET | Refills: 0 | Status: SHIPPED | OUTPATIENT
Start: 2023-08-21

## 2023-11-18 DIAGNOSIS — E03.9 ACQUIRED HYPOTHYROIDISM: ICD-10-CM

## 2023-11-21 RX ORDER — LEVOTHYROXINE SODIUM 0.05 MG/1
50 TABLET ORAL EVERY MORNING
Qty: 30 TABLET | Refills: 0 | Status: SHIPPED | OUTPATIENT
Start: 2023-11-21

## 2023-12-27 DIAGNOSIS — E03.9 ACQUIRED HYPOTHYROIDISM: ICD-10-CM

## 2023-12-27 RX ORDER — LEVOTHYROXINE SODIUM 0.05 MG/1
50 TABLET ORAL EVERY MORNING
Qty: 30 TABLET | Refills: 0 | Status: SHIPPED | OUTPATIENT
Start: 2023-12-27

## 2024-01-23 ENCOUNTER — TELEPHONE (OUTPATIENT)
Dept: FAMILY MEDICINE CLINIC | Facility: CLINIC | Age: 44
End: 2024-01-23

## 2024-01-23 NOTE — TELEPHONE ENCOUNTER
Hub staff attempted to follow warm transfer process and was unsuccessful     Caller: Patria Norris    Relationship to patient: Self    Best call back number: [phone]    Patient is needing: THE PATIENT WOULD LIKE TO SCHEDULE LABS TO HAVE HER THYROID LEVELS CHECKED ON FRIDAY, 01/26/2024, AT 8:15 A.M. THE PATIENT CURRENTLY HAS ACTIVE ORDERS FOR THESE LABS. PLEASE ADVISE.

## 2024-01-29 DIAGNOSIS — E03.9 ACQUIRED HYPOTHYROIDISM: Primary | ICD-10-CM

## 2024-01-29 RX ORDER — LEVOTHYROXINE SODIUM 0.07 MG/1
75 TABLET ORAL DAILY
Qty: 90 TABLET | Refills: 1 | Status: SHIPPED | OUTPATIENT
Start: 2024-01-29

## 2024-03-16 LAB
T3FREE SERPL-MCNC: 2.7 PG/ML (ref 2–4.4)
T4 FREE SERPL-MCNC: 1.16 NG/DL (ref 0.93–1.7)
TSH SERPL DL<=0.005 MIU/L-ACNC: 13.3 UIU/ML (ref 0.27–4.2)

## 2024-04-23 RX ORDER — LEVOTHYROXINE SODIUM 0.07 MG/1
75 TABLET ORAL DAILY
Qty: 90 TABLET | Refills: 0 | Status: SHIPPED | OUTPATIENT
Start: 2024-04-23

## 2024-05-09 ENCOUNTER — APPOINTMENT (OUTPATIENT)
Dept: WOMENS IMAGING | Facility: HOSPITAL | Age: 44
End: 2024-05-09
Payer: COMMERCIAL

## 2024-05-09 PROCEDURE — 77063 BREAST TOMOSYNTHESIS BI: CPT | Performed by: RADIOLOGY

## 2024-05-09 PROCEDURE — 77067 SCR MAMMO BI INCL CAD: CPT | Performed by: RADIOLOGY

## 2024-05-14 DIAGNOSIS — Z80.3 FAMILY HISTORY OF BREAST CANCER IN MOTHER: ICD-10-CM

## 2024-05-14 DIAGNOSIS — R92.1 BREAST CALCIFICATION SEEN ON MAMMOGRAM: Primary | ICD-10-CM

## 2024-06-25 ENCOUNTER — HOSPITAL ENCOUNTER (OUTPATIENT)
Dept: MRI IMAGING | Facility: HOSPITAL | Age: 44
Discharge: HOME | End: 2024-06-25
Admitting: NURSE PRACTITIONER
Payer: COMMERCIAL

## 2024-06-25 PROCEDURE — 0 GADOBENATE DIMEGLUMINE 529 MG/ML SOLUTION: Performed by: NURSE PRACTITIONER

## 2024-06-25 PROCEDURE — A9577 INJ MULTIHANCE: HCPCS | Performed by: NURSE PRACTITIONER

## 2024-06-25 PROCEDURE — 77049 MRI BREAST C-+ W/CAD BI: CPT

## 2024-06-25 RX ADMIN — GADOBENATE DIMEGLUMINE 20 ML: 529 INJECTION, SOLUTION INTRAVENOUS at 16:27

## 2024-07-02 ENCOUNTER — OFFICE VISIT (OUTPATIENT)
Dept: FAMILY MEDICINE CLINIC | Facility: CLINIC | Age: 44
End: 2024-07-02
Payer: COMMERCIAL

## 2024-07-02 VITALS
SYSTOLIC BLOOD PRESSURE: 110 MMHG | WEIGHT: 274 LBS | RESPIRATION RATE: 16 BRPM | HEART RATE: 64 BPM | DIASTOLIC BLOOD PRESSURE: 70 MMHG | OXYGEN SATURATION: 96 % | HEIGHT: 64 IN | BODY MASS INDEX: 46.78 KG/M2

## 2024-07-02 DIAGNOSIS — Z13.9 DUE FOR SCREENING: ICD-10-CM

## 2024-07-02 DIAGNOSIS — E03.9 ACQUIRED HYPOTHYROIDISM: Primary | ICD-10-CM

## 2024-07-02 PROCEDURE — 99214 OFFICE O/P EST MOD 30 MIN: CPT | Performed by: NURSE PRACTITIONER

## 2024-07-02 RX ORDER — LEVOTHYROXINE SODIUM 88 UG/1
88 TABLET ORAL DAILY
Qty: 90 TABLET | Refills: 3 | Status: SHIPPED | OUTPATIENT
Start: 2024-07-02

## 2024-07-02 NOTE — PROGRESS NOTES
"Reinier Norris is a 43 y.o. female.     Hypothyroidism  Pertinent negatives include no chest pain.       Since the last visit, she has overall felt well.  She has Hypothyroidism with review of labs today and adjustment made in medication doseage with follow up labs ordered.  she has been compliant with current medications have reviewed them.  The patient denies medication side effects.  Will refill medications. /70   Pulse 64   Resp 16   Ht 162.6 cm (64\")   Wt 124 kg (274 lb)   SpO2 96%   BMI 47.03 kg/m² .        Results for orders placed or performed in visit on 01/29/24   TSH    Specimen: Blood   Result Value Ref Range    TSH 13.300 (H) 0.270 - 4.200 uIU/mL   T4, Free    Specimen: Blood   Result Value Ref Range    Free T4 1.16 0.93 - 1.70 ng/dL   T3, Free    Specimen: Blood   Result Value Ref Range    T3, Free 2.7 2.0 - 4.4 pg/mL       Patient had levothyroxine increased to 75 mcg daily in January and had repeat labs in March which showed mild improvement in free T4.  Free T3 was unchanged.  TSH still elevated but somewhat improved.  She denies having any symptoms of hypothyroidism at this point.    The following portions of the patient's history were reviewed and updated as appropriate: allergies, current medications, past family history, past medical history, past social history, past surgical history, and problem list.    Review of Systems   Constitutional:  Negative for unexpected weight change.   Respiratory:  Negative for shortness of breath.    Cardiovascular:  Negative for chest pain and palpitations.   Psychiatric/Behavioral:  Negative for behavioral problems.        Objective   Physical Exam  Vitals and nursing note reviewed.   Constitutional:       Appearance: She is well-developed.   Neck:      Thyroid: No thyromegaly.      Vascular: No carotid bruit.   Cardiovascular:      Rate and Rhythm: Normal rate and regular rhythm.   Pulmonary:      Effort: Pulmonary effort is normal.      " Breath sounds: Normal breath sounds.   Neurological:      Mental Status: She is alert and oriented to person, place, and time.   Psychiatric:         Mood and Affect: Mood normal.         Behavior: Behavior normal.         Thought Content: Thought content normal.         Judgment: Judgment normal.         Assessment & Plan   Diagnoses and all orders for this visit:    1. Acquired hypothyroidism (Primary)  -     levothyroxine (Synthroid) 88 MCG tablet; Take 1 tablet by mouth Daily. Before breakfast for thyroid, new dose  Dispense: 90 tablet; Refill: 3  -     Comprehensive metabolic panel; Future  -     Lipid panel; Future  -     CBC and Differential; Future  -     TSH; Future  -     Vitamin D 25 hydroxy; Future  -     Vitamin B12; Future  -     T3, free; Future  -     T4, free; Future    2. Due for screening  -     Ambulatory Referral to Gynecology          Increase dose again to 88 mcg to try to get her a little higher up in the normal range.  Will get repeat labs in 8 weeks and she will be due for entire lab set.  She has not had a Pap in several years so we will put referral to gynecology.

## 2024-08-31 LAB — T4 FREE SERPL-MCNC: 1.21 NG/DL (ref 0.92–1.68)

## 2024-12-20 ENCOUNTER — OFFICE VISIT (OUTPATIENT)
Dept: OBSTETRICS AND GYNECOLOGY | Age: 44
End: 2024-12-20
Payer: COMMERCIAL

## 2024-12-20 VITALS
BODY MASS INDEX: 46.95 KG/M2 | HEIGHT: 64 IN | DIASTOLIC BLOOD PRESSURE: 78 MMHG | WEIGHT: 275 LBS | SYSTOLIC BLOOD PRESSURE: 124 MMHG

## 2024-12-20 DIAGNOSIS — Z80.3 FAMILY HISTORY OF BREAST CANCER: ICD-10-CM

## 2024-12-20 DIAGNOSIS — Z11.51 SCREENING FOR HUMAN PAPILLOMAVIRUS (HPV): ICD-10-CM

## 2024-12-20 DIAGNOSIS — Z01.419 WELL FEMALE EXAM WITH ROUTINE GYNECOLOGICAL EXAM: ICD-10-CM

## 2024-12-20 DIAGNOSIS — Z12.11 SCREEN FOR COLON CANCER: ICD-10-CM

## 2024-12-20 DIAGNOSIS — Z01.419 ENCOUNTER FOR GYNECOLOGICAL EXAMINATION WITHOUT ABNORMAL FINDING: Primary | ICD-10-CM

## 2024-12-20 DIAGNOSIS — Z12.4 SCREENING FOR MALIGNANT NEOPLASM OF CERVIX: ICD-10-CM

## 2024-12-20 DIAGNOSIS — Z12.31 SCREENING MAMMOGRAM FOR BREAST CANCER: ICD-10-CM

## 2024-12-20 NOTE — PROGRESS NOTES
Routine Annual Visit    2024    Patient: Patria Norris          MR#:[id number]      Chief Complaint   Patient presents with    Annual Exam     New pt annual exam : no complaints.  Last pap several years ago, never had abnormal   M/g 05/10/2024   MRI 2024          History of Present Illness    44 y.o. female  who presents for annual exam.     New pt to me  No pap in 10 years  No history of abn pap  Will collect pap today    Regular but heavy menses, tolerable , not interested in any treatment, not anemic    FH breast cancer, mother , aunt and sister, apparently no one BRCA tested  Pt with dense breasts and MRI done this summer  Breast biopsy x 1  Recommend inherited cancer screen  Rec yearly mammo and order placed for May  Consider staggered MRI yearly, pt may decline, she is unsure if she wants to continue them  Recommend rediscuss at next AE which will be about 6 months from her mammo due in May    Recommend colon cancer screening, pt agrees to cologuard      Patient's last menstrual period was 2024 (approximate).  Obstetric History:  OB History          0    Para   0    Term   0       0    AB   0    Living   0         SAB   0    IAB   0    Ectopic   0    Molar   0    Multiple   0    Live Births   0               Menstrual History:     Patient's last menstrual period was 2024 (approximate).       Sexual History:       ________________________________________  Patient Active Problem List   Diagnosis    Acute appendicitis with perforation, localized peritonitis, and gangrene, without abscess    Postoperative intra-abdominal abscess    Acquired hypothyroidism       Past Medical History:   Diagnosis Date    Cholelithiasis 3/30/23    Discovered during ct for abdominal pain    Varicella     When i was 11       Past Surgical History:   Procedure Laterality Date    APPENDECTOMY N/A 2023    Procedure: Laparoscopic appendectomy;  Surgeon: Amarilis Franklin MD;   "Location: [facility] MAIN OR;  Service: General;  Laterality: N/A;    BREAST BIOPSY  2020    Lump was biopsied in left breast    BREAST CYST ASPIRATION  2020    WISDOM TOOTH EXTRACTION      When i was 20       Social History     Tobacco Use   Smoking Status Former    Current packs/day: 0.00    Average packs/day: 0.3 packs/day for 21.4 years (5.3 ttl pk-yrs)    Types: Cigarettes    Start date: 8/15/1997    Quit date: 2019    Years since quittin.9   Smokeless Tobacco Never       has a current medication list which includes the following prescription(s): levothyroxine.  ________________________________________    Current contraception: abstinence  History of abnormal Pap smear: no  Family history of Breast cancer: yes, mother, aunt and sister  Family history of uterine or ovarian cancer: no  Family History of colon cancer/colon polyps: no  History of abnormal mammogram: yes - biopsy x 1      The following portions of the patient's history were reviewed and updated as appropriate: allergies, current medications, past family history, past medical history, past social history, past surgical history, and problem list.    Review of Systems    Pertinent items are noted in HPI.     Objective   Physical Exam    /78   Ht 162.6 cm (64\")   Wt 125 kg (275 lb)   LMP 2024 (Approximate)   BMI 47.20 kg/m²    BP Readings from Last 3 Encounters:   24 124/78   24 110/70   23 128/84      Wt Readings from Last 3 Encounters:   24 125 kg (275 lb)   24 124 kg (274 lb)   23 115 kg (253 lb 9.6 oz)      BMI: Estimated body mass index is 47.2 kg/m² as calculated from the following:    Height as of this encounter: 162.6 cm (64\").    Weight as of this encounter: 125 kg (275 lb).      General:   alert, appears stated age, and cooperative   Abdomen: soft, non-tender, without masses or organomegaly   Breast: inspection negative, no nipple discharge or bleeding, no masses or " nodularity palpable   Vulva: normal, Bartholin's, Urethra, [place]'s normal   Vagina: normal mucosa   Cervix: no cervical motion tenderness and no lesions   Uterus: normal size, mobile, and non-tender   Adnexa: no mass, fullness, tenderness     Assessment:    1. Normal annual exam   Assessment     ICD-10-CM ICD-9-CM   1. Encounter for gynecological examination without abnormal finding  Z01.419 V72.31   2. Family history of breast cancer  Z80.3 V16.3   3. Well female exam with routine gynecological exam  Z01.419 V72.31   4. Screening for human papillomavirus (HPV)  Z11.51 V73.81   5. Screening for malignant neoplasm of cervix  Z12.4 V76.2   6. Screening mammogram for breast cancer  Z12.31 V76.12   7. Screen for colon cancer  Z12.11 V76.51     Plan:    Plan     [x]  Mammogram request made  [x]  PAP done  []  Labs:   []  GC/Chl/TV  []  DEXA scan   []  Referral for colonoscopy:       Diagnoses and all orders for this visit:    1. Encounter for gynecological examination without abnormal finding (Primary)    2. Family history of breast cancer  -     INVITAE HEREDITARY CANCER; Future    3. Well female exam with routine gynecological exam  -     IGP, Apt HPV,rfx 16 / 18,45    4. Screening for human papillomavirus (HPV)  -     IGP, Apt HPV,rfx 16 / 18,45    5. Screening for malignant neoplasm of cervix  -     IGP, Apt HPV,rfx 16 / 18,45    6. Screening mammogram for breast cancer  -     Mammo Screening Digital Tomosynthesis Bilateral With CAD; Future    7. Screen for colon cancer  -     Cologuard - Stool, Per Rectum; Future            Counseling:  --Nutrition: Stressed importance of moderation and caloric balance, stressed fresh fruit and vegetables  --Exercise: Stressed the importance of regular exercise. 3-5 times weekly   - Discussed screening mammogram recommendations.   --Discussed benefits of screening colonoscopy- age 45 unless FH  --Discussed pap smear screening recommendations

## 2024-12-30 LAB
CYTOLOGIST CVX/VAG CYTO: NORMAL
CYTOLOGY CVX/VAG DOC CYTO: NORMAL
CYTOLOGY CVX/VAG DOC THIN PREP: NORMAL
DX ICD CODE: NORMAL
HPV I/H RISK 4 DNA CVX QL PROBE+SIG AMP: NEGATIVE
Lab: NORMAL
OTHER STN SPEC: NORMAL
STAT OF ADQ CVX/VAG CYTO-IMP: NORMAL

## 2024-12-31 DIAGNOSIS — E03.9 ACQUIRED HYPOTHYROIDISM: ICD-10-CM

## 2025-01-02 RX ORDER — LEVOTHYROXINE SODIUM 88 UG/1
88 TABLET ORAL DAILY
Qty: 90 TABLET | Refills: 3 | OUTPATIENT
Start: 2025-01-02

## 2025-01-31 ENCOUNTER — OFFICE VISIT (OUTPATIENT)
Dept: OBSTETRICS AND GYNECOLOGY | Age: 45
End: 2025-01-31
Payer: COMMERCIAL

## 2025-01-31 VITALS
WEIGHT: 278 LBS | DIASTOLIC BLOOD PRESSURE: 88 MMHG | HEIGHT: 64 IN | BODY MASS INDEX: 47.46 KG/M2 | SYSTOLIC BLOOD PRESSURE: 128 MMHG

## 2025-01-31 DIAGNOSIS — Z15.09 RAD51D GENE MUTATION POSITIVE: Primary | ICD-10-CM

## 2025-01-31 DIAGNOSIS — Z15.01 RAD51D GENE MUTATION POSITIVE: Primary | ICD-10-CM

## 2025-01-31 DIAGNOSIS — Z80.3 FAMILY HISTORY OF BREAST CANCER: ICD-10-CM

## 2025-01-31 PROCEDURE — 99213 OFFICE O/P EST LOW 20 MIN: CPT | Performed by: OBSTETRICS & GYNECOLOGY

## 2025-01-31 NOTE — PROGRESS NOTES
GYN Visit    2025    Patient: Patria Norris          MR#:[id number]      Chief Complaint   Patient presents with    Follow-up     Gyn F/u & US - Discuss recent invitae results & US to evaluate ovaries        History of Present Illness    44 y.o. 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          No LMP recorded.    ________________________________________  Patient Active Problem List   Diagnosis    Acute appendicitis with perforation, localized peritonitis, and gangrene, without abscess    Postoperative intra-abdominal abscess    Acquired hypothyroidism       Past Medical History:   Diagnosis Date    Cholelithiasis 3/30/23    Discovered during ct for abdominal pain    Varicella     When i was 11       Past Surgical History:   Procedure Laterality Date    APPENDECTOMY N/A 2023    Procedure: Laparoscopic appendectomy;  Surgeon: Amarilis Franklin MD;  Location: [facility];  Service: General;  Laterality: N/A;    BREAST BIOPSY  2020    Lump was biopsied in left breast    BREAST CYST ASPIRATION  2020    WISDOM TOOTH EXTRACTION      When i was 20       Social History     Tobacco Use   Smoking Status Former    Current packs/day: 0.00    Average packs/day: 0.3 packs/day for 21.4 years (5.3 ttl pk-yrs)    Types: Cigarettes    Start date: 8/15/1997    Quit date: 2019    Years since quittin.0  "  Smokeless Tobacco Never       has a current medication list which includes the following prescription(s): levothyroxine and norethindrone-ethinyl estradiol-ferrous fumarate.  ________________________________________    Current contraception: none      The following portions of the patient's history were reviewed and updated as appropriate: allergies, current medications, past family history, past medical history, past social history, past surgical history, and problem list.    Review of Systems    Pertinent items are noted in HPI.     Objective   Physical Exam    /88 (BP Location: Left arm, Patient Position: Sitting)   Ht 162.6 cm (64\")   Wt 126 kg (278 lb)   BMI 47.72 kg/m²    BP Readings from Last 3 Encounters:   01/31/25 128/88   12/20/24 124/78   07/02/24 110/70      Wt Readings from Last 3 Encounters:   01/31/25 126 kg (278 lb)   12/20/24 125 kg (275 lb)   07/02/24 124 kg (274 lb)      BMI: Estimated body mass index is 47.72 kg/m² as calculated from the following:    Height as of this encounter: 162.6 cm (64\").    Weight as of this encounter: 126 kg (278 lb).    Lungs: non labored breathing, no wheezing or tachpnea  Extremities: extremities normal, atraumatic, no cyanosis or edema  Skin: Skin color, texture, turgor normal. No rashes or lesions  Neurologic: Grossly normal  General:   alert, appears stated age, and cooperative            See ultrasound report normal ultrasound.  Lining is normal.  Ovaries are normal.  No free fluid.                     Assessment:      Diagnoses and all orders for this visit:    1. RAD51D gene mutation positive (Primary)  -     Ambulatory Referral to Genetic Counseling/Testing  -     Ambulatory Referral to Breast Surgery    2. Family history of breast cancer  -     Ambulatory Referral to Genetic Counseling/Testing  -     Ambulatory Referral to Breast Surgery    Other orders  -     norethindrone-ethinyl estradiol-ferrous fumarate (LOESTIN 24 FE) 1-20 MG-MCG(24) per " tablet; Take 1 tablet by mouth Daily.  Dispense: 84 tablet; Refill: 3      Recommended MRI of the breast, patient declines  Patient has a mammogram coming up in May  She would like to decide on MRI after the mammogram  Recommended oral contraceptive pills to reduce risk of ovarian cancer  Patient is willing to try this and Loestrin was sent to her pharmacy  Ultrasound was reviewed today and was normal  I did discuss that there is no screening test for ovarian cancer  Discussed recommendations of RAD 51D  Recommendation is to screen with mammogram and MRI staggered every 6 months  Risk reducing mastectomy is not recommended but based on family history could be considered  BSO is recommended by age 50  I briefly discussed tamoxifen to reduce breast cancer risk, patient is not interested in this at this time  I recommend consult with high risk breast clinic to get recommendations on screening,  possible risk reducing mastectomy  I recommend genetic counseling and consult was placed  She may want to alert her family members of her carrier status so they may be tested as well    Overall plan is  Mammogram in May  Consider MRI pending consult with high risk breast clinic  Consider BSO at some point after patient is fully educated with genetic counseling  OCPs meanwhile to reduce ovarian cancer risk  Ultrasound today is normal  Follow-up annual exam the end of this year  Patient knows she can call at any time if she wants to proceed with a laparoscopic BSO  We will rediscuss all these issues at her annual exam at the end of the year

## 2025-02-10 ENCOUNTER — TELEPHONE (OUTPATIENT)
Dept: SURGERY | Facility: CLINIC | Age: 45
End: 2025-02-10
Payer: COMMERCIAL

## 2025-02-10 NOTE — TELEPHONE ENCOUNTER
New patient chart prepped for CHAU Santos review and scheduling. (Referral for Fhx of breast cancer)

## 2025-02-17 ENCOUNTER — TELEPHONE (OUTPATIENT)
Dept: SURGERY | Facility: CLINIC | Age: 45
End: 2025-02-17
Payer: COMMERCIAL

## 2025-02-17 NOTE — TELEPHONE ENCOUNTER
Spoke to pt and got her shc for a new pt appt with juliana loomis     Pt stated understanding  Mailed new pt packet

## 2025-04-05 DIAGNOSIS — E03.9 ACQUIRED HYPOTHYROIDISM: ICD-10-CM

## 2025-04-07 RX ORDER — LEVOTHYROXINE SODIUM 88 UG/1
88 TABLET ORAL DAILY
Qty: 90 TABLET | Refills: 3 | OUTPATIENT
Start: 2025-04-07

## 2025-05-21 ENCOUNTER — APPOINTMENT (OUTPATIENT)
Dept: WOMENS IMAGING | Facility: HOSPITAL | Age: 45
End: 2025-05-21
Payer: COMMERCIAL

## 2025-05-21 PROCEDURE — 77067 SCR MAMMO BI INCL CAD: CPT | Performed by: RADIOLOGY

## 2025-05-21 PROCEDURE — 77063 BREAST TOMOSYNTHESIS BI: CPT | Performed by: RADIOLOGY

## 2025-07-06 DIAGNOSIS — E03.9 ACQUIRED HYPOTHYROIDISM: ICD-10-CM

## 2025-07-08 RX ORDER — LEVOTHYROXINE SODIUM 88 UG/1
88 TABLET ORAL
Qty: 90 TABLET | Refills: 0 | Status: SHIPPED | OUTPATIENT
Start: 2025-07-08

## 2025-07-15 ENCOUNTER — OFFICE VISIT (OUTPATIENT)
Dept: FAMILY MEDICINE CLINIC | Facility: CLINIC | Age: 45
End: 2025-07-15
Payer: COMMERCIAL

## 2025-07-15 VITALS
WEIGHT: 274.6 LBS | TEMPERATURE: 97.7 F | HEIGHT: 64 IN | HEART RATE: 65 BPM | DIASTOLIC BLOOD PRESSURE: 78 MMHG | BODY MASS INDEX: 46.88 KG/M2 | SYSTOLIC BLOOD PRESSURE: 130 MMHG | OXYGEN SATURATION: 97 %

## 2025-07-15 DIAGNOSIS — E03.9 ACQUIRED HYPOTHYROIDISM: ICD-10-CM

## 2025-07-15 PROCEDURE — 99213 OFFICE O/P EST LOW 20 MIN: CPT | Performed by: NURSE PRACTITIONER

## 2025-07-15 NOTE — PROGRESS NOTES
"Reinier Norris is a 44 y.o. female.     History of Present Illness   Chief Complaint     Hypothyroidism (-Excessive Sweating  -Pt had appendix removed and ever since pt has been having this issue.)     Since the last visit, she has overall felt fairly well other than significant increase in sweating. Feels that it started after she had appendectomy 2 years ago.  She has Hypothyroidism and must update labs to continue treatment.  she has been compliant with current medications have reviewed them.  The patient denies medication side effects.  Will refill medications. /78   Pulse 65   Temp 97.7 °F (36.5 °C) (Temporal)   Ht 162.6 cm (64.02\")   Wt 125 kg (274 lb 9.6 oz)   SpO2 97%   BMI 47.11 kg/m² .        Results for orders placed or performed in visit on 12/21/24   Cologuard - Stool, Per Rectum    Collection Time: 01/03/25 10:45 AM    Specimen: Per Rectum; Stool   Result Value Ref Range    Cologuard Negative Negative         The following portions of the patient's history were reviewed and updated as appropriate: allergies, current medications, past family history, past medical history, past social history, past surgical history, and problem list.    Review of Systems   Constitutional:  Positive for diaphoresis. Negative for chills, fatigue and fever.   HENT:  Negative for congestion and sore throat.    Respiratory:  Negative for cough.    Cardiovascular:  Negative for chest pain.   Gastrointestinal:  Negative for abdominal pain, nausea and vomiting.   Genitourinary:  Negative for dysuria.   Musculoskeletal:  Negative for myalgias and neck pain.   Skin:  Negative for rash.   Neurological:  Negative for weakness, numbness and headaches.       Objective   Physical Exam  Vitals and nursing note reviewed.   Constitutional:       Appearance: She is well-developed.   Neck:      Thyroid: No thyromegaly.      Vascular: No carotid bruit.   Cardiovascular:      Rate and Rhythm: Normal rate and regular " rhythm.   Pulmonary:      Effort: Pulmonary effort is normal.      Breath sounds: Normal breath sounds.   Neurological:      Mental Status: She is alert and oriented to person, place, and time.   Psychiatric:         Mood and Affect: Mood normal.         Behavior: Behavior normal.         Thought Content: Thought content normal.         Judgment: Judgment normal.         Assessment & Plan   Diagnoses and all orders for this visit:    1. Acquired hypothyroidism  -     Comprehensive metabolic panel  -     Lipid panel  -     CBC and Differential  -     TSH  -     Vitamin D 25 hydroxy  -     Vitamin B12  -     T3, free  -     T4, Free        Will update labs to see if thyroid is in range since she is having increased sweating.

## 2025-07-16 LAB
25(OH)D3+25(OH)D2 SERPL-MCNC: 26.4 NG/ML (ref 30–100)
ALBUMIN SERPL-MCNC: 4 G/DL (ref 3.5–5.2)
ALBUMIN/GLOB SERPL: 1.4 G/DL
ALP SERPL-CCNC: 52 U/L (ref 39–117)
ALT SERPL-CCNC: 13 U/L (ref 1–33)
AST SERPL-CCNC: 12 U/L (ref 1–32)
BASOPHILS # BLD AUTO: 0.07 10*3/MM3 (ref 0–0.2)
BASOPHILS NFR BLD AUTO: 0.8 % (ref 0–1.5)
BILIRUB SERPL-MCNC: 0.5 MG/DL (ref 0–1.2)
BUN SERPL-MCNC: 12 MG/DL (ref 6–20)
BUN/CREAT SERPL: 9.8 (ref 7–25)
CALCIUM SERPL-MCNC: 9.4 MG/DL (ref 8.6–10.5)
CHLORIDE SERPL-SCNC: 104 MMOL/L (ref 98–107)
CHOLEST SERPL-MCNC: 159 MG/DL (ref 0–200)
CO2 SERPL-SCNC: 26.4 MMOL/L (ref 22–29)
CREAT SERPL-MCNC: 1.23 MG/DL (ref 0.57–1)
EGFRCR SERPLBLD CKD-EPI 2021: 55.3 ML/MIN/1.73
EOSINOPHIL # BLD AUTO: 0.13 10*3/MM3 (ref 0–0.4)
EOSINOPHIL NFR BLD AUTO: 1.5 % (ref 0.3–6.2)
ERYTHROCYTE [DISTWIDTH] IN BLOOD BY AUTOMATED COUNT: 12.6 % (ref 12.3–15.4)
GLOBULIN SER CALC-MCNC: 2.9 GM/DL
GLUCOSE SERPL-MCNC: 97 MG/DL (ref 65–99)
HCT VFR BLD AUTO: 41.8 % (ref 34–46.6)
HDLC SERPL-MCNC: 40 MG/DL (ref 40–60)
HGB BLD-MCNC: 13.7 G/DL (ref 12–15.9)
IMM GRANULOCYTES # BLD AUTO: 0.03 10*3/MM3 (ref 0–0.05)
IMM GRANULOCYTES NFR BLD AUTO: 0.3 % (ref 0–0.5)
LDLC SERPL CALC-MCNC: 97 MG/DL (ref 0–100)
LYMPHOCYTES # BLD AUTO: 2.77 10*3/MM3 (ref 0.7–3.1)
LYMPHOCYTES NFR BLD AUTO: 31.3 % (ref 19.6–45.3)
MCH RBC QN AUTO: 30.4 PG (ref 26.6–33)
MCHC RBC AUTO-ENTMCNC: 32.8 G/DL (ref 31.5–35.7)
MCV RBC AUTO: 92.7 FL (ref 79–97)
MONOCYTES # BLD AUTO: 0.62 10*3/MM3 (ref 0.1–0.9)
MONOCYTES NFR BLD AUTO: 7 % (ref 5–12)
NEUTROPHILS # BLD AUTO: 5.24 10*3/MM3 (ref 1.7–7)
NEUTROPHILS NFR BLD AUTO: 59.1 % (ref 42.7–76)
NRBC BLD AUTO-RTO: 0 /100 WBC (ref 0–0.2)
PLATELET # BLD AUTO: 356 10*3/MM3 (ref 140–450)
POTASSIUM SERPL-SCNC: 5.1 MMOL/L (ref 3.5–5.2)
PROT SERPL-MCNC: 6.9 G/DL (ref 6–8.5)
RBC # BLD AUTO: 4.51 10*6/MM3 (ref 3.77–5.28)
SODIUM SERPL-SCNC: 140 MMOL/L (ref 136–145)
T3FREE SERPL-MCNC: 2.8 PG/ML (ref 2–4.4)
T4 FREE SERPL-MCNC: 1.09 NG/DL (ref 0.92–1.68)
TRIGL SERPL-MCNC: 121 MG/DL (ref 0–150)
TSH SERPL DL<=0.005 MIU/L-ACNC: 14.2 UIU/ML (ref 0.27–4.2)
VIT B12 SERPL-MCNC: 233 PG/ML (ref 211–946)
VLDLC SERPL CALC-MCNC: 22 MG/DL (ref 5–40)
WBC # BLD AUTO: 8.86 10*3/MM3 (ref 3.4–10.8)

## (undated) DEVICE — ENDOPATH XCEL BLADELESS TROCARS WITH STABILITY SLEEVES: Brand: ENDOPATH XCEL

## (undated) DEVICE — ENDOPATH XCEL BLUNT TIP TROCARS WITH SMOOTH SLEEVES: Brand: ENDOPATH XCEL

## (undated) DEVICE — GOWN,SIRUS,NON REINFRCD,LARGE,SET IN SL: Brand: MEDLINE

## (undated) DEVICE — LAPAROVUE VISIBILITY SYSTEM LAPAROSCOPIC SOLUTIONS: Brand: LAPAROVUE

## (undated) DEVICE — HARMONIC ACE +7 LAPAROSCOPIC SHEARS ADVANCED HEMOSTASIS 5MM DIAMETER 36CM SHAFT LENGTH  FOR USE WITH GRAY HAND PIECE ONLY: Brand: HARMONIC ACE

## (undated) DEVICE — GLV SURG SENSICARE POLYISPRN W/ALOE PF LF 6.5 GRN STRL

## (undated) DEVICE — ENDOPATH XCEL UNIVERSAL TROCAR STABLILITY SLEEVES: Brand: ENDOPATH XCEL

## (undated) DEVICE — ENDOPOUCH RETRIEVER SPECIMEN RETRIEVAL BAGS: Brand: ENDOPOUCH RETRIEVER

## (undated) DEVICE — GLV SURG BIOGEL LTX PF 6

## (undated) DEVICE — SUT VIC 0/0 UR6 27IN DYED J603H

## (undated) DEVICE — DISPOSABLE MONOPOLAR ENDOSCOPIC CORD 10 FT. (3M): Brand: KIRWAN

## (undated) DEVICE — ENDOCUT SCISSOR TIP, DISPOSABLE: Brand: RENEW

## (undated) DEVICE — ADHS SKIN SURG TISS VISC PREMIERPRO EXOFIN HI/VISC FAST/DRY

## (undated) DEVICE — LOU LAP CHOLE: Brand: MEDLINE INDUSTRIES, INC.

## (undated) DEVICE — ECHELON FLEX45 ENDOPATH STAPLER, ARTICULATING ENDOSCOPIC LINEAR CUTTER (NO CARTRIDGE): Brand: ECHELON ENDOPATH

## (undated) DEVICE — ANTIBACTERIAL UNDYED BRAIDED (POLYGLACTIN 910), SYNTHETIC ABSORBABLE SUTURE: Brand: COATED VICRYL

## (undated) DEVICE — APPL CHLORAPREP HI/LITE 26ML ORNG

## (undated) DEVICE — TOTAL TRAY, 16FR 10ML SIL FOLEY, URN: Brand: MEDLINE